# Patient Record
Sex: FEMALE | Race: WHITE | Employment: OTHER | URBAN - METROPOLITAN AREA
[De-identification: names, ages, dates, MRNs, and addresses within clinical notes are randomized per-mention and may not be internally consistent; named-entity substitution may affect disease eponyms.]

---

## 2023-11-17 ENCOUNTER — OFFICE VISIT (OUTPATIENT)
Dept: PRIMARY CARE | Facility: CLINIC | Age: 78
End: 2023-11-17
Payer: COMMERCIAL

## 2023-11-17 VITALS
HEART RATE: 69 BPM | DIASTOLIC BLOOD PRESSURE: 82 MMHG | TEMPERATURE: 97.1 F | WEIGHT: 154 LBS | BODY MASS INDEX: 30.23 KG/M2 | HEIGHT: 60 IN | SYSTOLIC BLOOD PRESSURE: 128 MMHG

## 2023-11-17 DIAGNOSIS — L97.121: Primary | ICD-10-CM

## 2023-11-17 DIAGNOSIS — I10 PRIMARY HYPERTENSION: ICD-10-CM

## 2023-11-17 DIAGNOSIS — L03.116 CELLULITIS OF LEFT THIGH: ICD-10-CM

## 2023-11-17 PROCEDURE — 3079F DIAST BP 80-89 MM HG: CPT | Performed by: INTERNAL MEDICINE

## 2023-11-17 PROCEDURE — 3074F SYST BP LT 130 MM HG: CPT | Performed by: INTERNAL MEDICINE

## 2023-11-17 PROCEDURE — 1159F MED LIST DOCD IN RCRD: CPT | Performed by: INTERNAL MEDICINE

## 2023-11-17 PROCEDURE — 1036F TOBACCO NON-USER: CPT | Performed by: INTERNAL MEDICINE

## 2023-11-17 PROCEDURE — 99213 OFFICE O/P EST LOW 20 MIN: CPT | Performed by: INTERNAL MEDICINE

## 2023-11-17 RX ORDER — FLUTICASONE FUROATE, UMECLIDINIUM BROMIDE AND VILANTEROL TRIFENATATE 100; 62.5; 25 UG/1; UG/1; UG/1
1 POWDER RESPIRATORY (INHALATION) DAILY
COMMUNITY
Start: 2023-07-17 | End: 2024-05-08 | Stop reason: SDUPTHER

## 2023-11-17 RX ORDER — LISINOPRIL 20 MG/1
20 TABLET ORAL DAILY
COMMUNITY
Start: 2023-10-24 | End: 2024-03-19 | Stop reason: SINTOL

## 2023-11-17 RX ORDER — ALBUTEROL SULFATE 90 UG/1
2 AEROSOL, METERED RESPIRATORY (INHALATION) EVERY 6 HOURS PRN
COMMUNITY
Start: 2023-01-09

## 2023-11-17 RX ORDER — ASPIRIN 325 MG
50000 TABLET, DELAYED RELEASE (ENTERIC COATED) ORAL
COMMUNITY
Start: 2023-04-25 | End: 2024-04-03 | Stop reason: SDUPTHER

## 2023-11-17 RX ORDER — ROSUVASTATIN CALCIUM 10 MG/1
10 TABLET, COATED ORAL NIGHTLY
COMMUNITY
Start: 2023-07-17 | End: 2024-03-19 | Stop reason: SDUPTHER

## 2023-11-17 RX ORDER — DOXYCYCLINE 100 MG/1
100 CAPSULE ORAL 2 TIMES DAILY
Qty: 20 CAPSULE | Refills: 0 | Status: SHIPPED | OUTPATIENT
Start: 2023-11-17 | End: 2023-11-27

## 2023-11-17 ASSESSMENT — PATIENT HEALTH QUESTIONNAIRE - PHQ9
2. FEELING DOWN, DEPRESSED OR HOPELESS: NOT AT ALL
1. LITTLE INTEREST OR PLEASURE IN DOING THINGS: NOT AT ALL
SUM OF ALL RESPONSES TO PHQ9 QUESTIONS 1 & 2: 0

## 2023-11-17 ASSESSMENT — ENCOUNTER SYMPTOMS
DEPRESSION: 0
LOSS OF SENSATION IN FEET: 0
OCCASIONAL FEELINGS OF UNSTEADINESS: 0

## 2023-11-17 NOTE — PROGRESS NOTES
Patient is seen in the office today accompanied by her daughter for left thigh ulcer for almost a month.  Patient denies any trauma or injury to that area.  The surrounding skin is slightly red and tender.  She denies any fever or chills.  Has no shortness of breath or wheezing.  Denies any other rash.  Review of other systems are negative.    On examination:  General examination: There is no acute discomfort  Eyes: There is no pallor or jaundice  Lungs: Clear to auscultation  CVS: Heart sounds are regular there is no gallop murmur  Skin: On the lateral aspect of left thigh there is 1/2 cm diameter ulcer with serosanguineous discharge.  Surrounding skin is erythematous.    Assessment and plan:  1.  Left thigh ulcer: Etiology is not clear.  Local dressing is applied and antibiotic is being prescribed  2.  Cellulitis of the left thigh: We will prescribe doxycycline.  Patient is advised to call my office if there is no improvement in her symptoms.  If the ulcer is nonhealing she may need biopsy and further investigation which is explained to the patient  3.  Hypertension: Controlled on the current medications

## 2024-03-18 PROBLEM — I73.89 OTHER SPECIFIED PERIPHERAL VASCULAR DISEASES (CMS-HCC): Status: ACTIVE | Noted: 2024-03-18

## 2024-03-18 PROBLEM — B35.1 ONYCHOMYCOSIS: Status: ACTIVE | Noted: 2024-03-18

## 2024-03-19 ENCOUNTER — OFFICE VISIT (OUTPATIENT)
Dept: PRIMARY CARE | Facility: CLINIC | Age: 79
End: 2024-03-19
Payer: MEDICARE

## 2024-03-19 VITALS
DIASTOLIC BLOOD PRESSURE: 67 MMHG | HEART RATE: 70 BPM | SYSTOLIC BLOOD PRESSURE: 145 MMHG | WEIGHT: 157 LBS | HEIGHT: 60 IN | TEMPERATURE: 98 F | BODY MASS INDEX: 30.82 KG/M2

## 2024-03-19 DIAGNOSIS — J30.9 ALLERGIC RHINITIS, UNSPECIFIED SEASONALITY, UNSPECIFIED TRIGGER: ICD-10-CM

## 2024-03-19 DIAGNOSIS — L98.491 SKIN ULCER, LIMITED TO BREAKDOWN OF SKIN (MULTI): ICD-10-CM

## 2024-03-19 DIAGNOSIS — Z78.0 MENOPAUSE PRESENT: ICD-10-CM

## 2024-03-19 DIAGNOSIS — Z00.00 HEALTH MAINTENANCE EXAMINATION: Primary | ICD-10-CM

## 2024-03-19 DIAGNOSIS — L98.9 SKIN LESION: ICD-10-CM

## 2024-03-19 DIAGNOSIS — E78.2 MIXED HYPERLIPIDEMIA: ICD-10-CM

## 2024-03-19 DIAGNOSIS — R06.09 DYSPNEA ON EXERTION: ICD-10-CM

## 2024-03-19 DIAGNOSIS — I10 PRIMARY HYPERTENSION: ICD-10-CM

## 2024-03-19 DIAGNOSIS — I48.91 NEW ONSET A-FIB (MULTI): ICD-10-CM

## 2024-03-19 DIAGNOSIS — Z13.29 THYROID DISORDER SCREENING: ICD-10-CM

## 2024-03-19 DIAGNOSIS — R94.31 ABNORMAL ECG: ICD-10-CM

## 2024-03-19 DIAGNOSIS — E55.9 VITAMIN D DEFICIENCY: ICD-10-CM

## 2024-03-19 PROCEDURE — G0444 DEPRESSION SCREEN ANNUAL: HCPCS | Performed by: INTERNAL MEDICINE

## 2024-03-19 PROCEDURE — 1160F RVW MEDS BY RX/DR IN RCRD: CPT | Performed by: INTERNAL MEDICINE

## 2024-03-19 PROCEDURE — 99397 PER PM REEVAL EST PAT 65+ YR: CPT | Performed by: INTERNAL MEDICINE

## 2024-03-19 PROCEDURE — G0439 PPPS, SUBSEQ VISIT: HCPCS | Performed by: INTERNAL MEDICINE

## 2024-03-19 PROCEDURE — 1159F MED LIST DOCD IN RCRD: CPT | Performed by: INTERNAL MEDICINE

## 2024-03-19 PROCEDURE — 93000 ELECTROCARDIOGRAM COMPLETE: CPT | Performed by: INTERNAL MEDICINE

## 2024-03-19 PROCEDURE — 3078F DIAST BP <80 MM HG: CPT | Performed by: INTERNAL MEDICINE

## 2024-03-19 PROCEDURE — 1036F TOBACCO NON-USER: CPT | Performed by: INTERNAL MEDICINE

## 2024-03-19 PROCEDURE — 1170F FXNL STATUS ASSESSED: CPT | Performed by: INTERNAL MEDICINE

## 2024-03-19 PROCEDURE — 99214 OFFICE O/P EST MOD 30 MIN: CPT | Performed by: INTERNAL MEDICINE

## 2024-03-19 PROCEDURE — 3077F SYST BP >= 140 MM HG: CPT | Performed by: INTERNAL MEDICINE

## 2024-03-19 RX ORDER — VALSARTAN 160 MG/1
160 TABLET ORAL DAILY
Qty: 100 TABLET | Refills: 3 | Status: SHIPPED | OUTPATIENT
Start: 2024-03-19 | End: 2025-04-23

## 2024-03-19 RX ORDER — ROSUVASTATIN CALCIUM 10 MG/1
10 TABLET, COATED ORAL NIGHTLY
Qty: 90 TABLET | Refills: 1 | Status: SHIPPED | OUTPATIENT
Start: 2024-03-19 | End: 2024-09-15

## 2024-03-19 RX ORDER — CETIRIZINE HYDROCHLORIDE 10 MG/1
10 TABLET ORAL AS NEEDED
COMMUNITY

## 2024-03-19 RX ORDER — ALBUTEROL SULFATE 0.83 MG/ML
2.5 SOLUTION RESPIRATORY (INHALATION) EVERY 4 HOURS PRN
COMMUNITY
Start: 2023-11-16

## 2024-03-19 RX ORDER — DOXYCYCLINE 100 MG/1
100 CAPSULE ORAL 2 TIMES DAILY
Qty: 14 CAPSULE | Refills: 0 | Status: SHIPPED | OUTPATIENT
Start: 2024-03-19 | End: 2024-03-26

## 2024-03-19 RX ORDER — OLOPATADINE HYDROCHLORIDE AND MOMETASONE FUROATE 25; 665 UG/1; UG/1
1 SPRAY, METERED NASAL 2 TIMES DAILY
Qty: 29 G | Refills: 0 | COMMUNITY
Start: 2024-03-19

## 2024-03-19 ASSESSMENT — ENCOUNTER SYMPTOMS
LOSS OF SENSATION IN FEET: 0
OCCASIONAL FEELINGS OF UNSTEADINESS: 0
PALPITATIONS: 0
CHILLS: 0
VOMITING: 0
ABDOMINAL PAIN: 0
DEPRESSION: 0
AGITATION: 0
SHORTNESS OF BREATH: 1
FEVER: 0
COUGH: 1
BLOOD IN STOOL: 0
NERVOUS/ANXIOUS: 1
SORE THROAT: 0
DIZZINESS: 1
DYSURIA: 0
DIARRHEA: 0
NAUSEA: 0

## 2024-03-19 ASSESSMENT — ACTIVITIES OF DAILY LIVING (ADL)
DRESSING YOURSELF: INDEPENDENT
WALKS IN HOME: INDEPENDENT
TOILETING: INDEPENDENT
FEEDING YOURSELF: INDEPENDENT
TAKING_MEDICATION: NEEDS ASSISTANCE
DOING_HOUSEWORK: INDEPENDENT
PATIENT'S MEMORY ADEQUATE TO SAFELY COMPLETE DAILY ACTIVITIES?: YES
BATHING: INDEPENDENT
ADEQUATE_TO_COMPLETE_ADL: YES
GROCERY_SHOPPING: NEEDS ASSISTANCE
GROOMING: INDEPENDENT
JUDGMENT_ADEQUATE_SAFELY_COMPLETE_DAILY_ACTIVITIES: YES
MANAGING_FINANCES: TOTAL CARE
HEARING - RIGHT EAR: DIFFICULTY WITH NOISE
HEARING - LEFT EAR: DIFFICULTY WITH NOISE

## 2024-03-19 ASSESSMENT — PATIENT HEALTH QUESTIONNAIRE - PHQ9
1. LITTLE INTEREST OR PLEASURE IN DOING THINGS: NOT AT ALL
SUM OF ALL RESPONSES TO PHQ9 QUESTIONS 1 AND 2: 0
2. FEELING DOWN, DEPRESSED OR HOPELESS: NOT AT ALL

## 2024-03-19 ASSESSMENT — COLUMBIA-SUICIDE SEVERITY RATING SCALE - C-SSRS
1. IN THE PAST MONTH, HAVE YOU WISHED YOU WERE DEAD OR WISHED YOU COULD GO TO SLEEP AND NOT WAKE UP?: NO
2. HAVE YOU ACTUALLY HAD ANY THOUGHTS OF KILLING YOURSELF?: NO
6. HAVE YOU EVER DONE ANYTHING, STARTED TO DO ANYTHING, OR PREPARED TO DO ANYTHING TO END YOUR LIFE?: NO

## 2024-03-19 NOTE — PROGRESS NOTES
Subjective   Reason for Visit: Marion Robles is an 78 y.o. female here for a Medicare Wellness visit.     HPI The patient presents to the office for annual wellness visit.  She is up-to-date on age and gender recommended screening with the exception of mammogram and colonoscopy which she defers.  She would like to do bone density study.  Patient has multiple complaints today chronic cough, postnasal drip, multiple skin ulcerations that are recurrent.  She saw my partner in November for this and at that time was treated for a left thigh ulcer that resolved now has multiple ulcers on the arm and left posterior back with some erythema surrounding the ulcer on the posterior shoulder. These are superficial.  She denies any fever, chills, chest pain.  Does have shortness of breath with exertion.  No palpitations.  She did see pulmonology for evaluation of COPD and possible asthma but was unable to tolerate the pulmonary function test and so it was nondiagnostic.  She was supposed to follow-up for a CT scan of the chest that was abnormal in August but has not completed this yet.  Patient also due for blood work and concerned about allergies to animals we discussed doing a blood test to look for possible allergies.  She does not take her medications consistently is noncompliant with medications.  Sometimes only take the medicine 2-3 times a week.  She is not taking Trelegy consistently either.  She is not up-to-date on age and gender recommended Immunizations due for tetanus vaccine and shingles vaccine which she is recommend to get at her pharmacy.      Patient Care Team:  Anderson Gutierrez DO as PCP - General  Anderson Gutierrez DO as PCP - [employer] PCP     Review of Systems   Constitutional:  Negative for chills and fever.   HENT:  Negative for sore throat.    Eyes:  Negative for visual disturbance.   Respiratory:  Positive for cough and shortness of breath.    Cardiovascular:  Negative for chest pain,  palpitations and leg swelling.   Gastrointestinal:  Negative for abdominal pain, blood in stool, diarrhea, nausea and vomiting.   Genitourinary:  Negative for dysuria.   Skin:  Negative for rash.   Neurological:  Positive for dizziness. Negative for syncope.   Psychiatric/Behavioral:  Negative for agitation. The patient is nervous/anxious.        Objective   Vitals:  /67   Pulse 70   Temp 36.7 °C (98 °F) (Temporal)   Ht 1.524 m (5')   Wt 71.2 kg (157 lb)   BMI 30.66 kg/m²       Physical Exam  Vitals and nursing note reviewed.   Constitutional:       General: She is not in acute distress.     Appearance: Normal appearance. She is obese. She is not ill-appearing, toxic-appearing or diaphoretic.   HENT:      Head: Normocephalic and atraumatic.      Nose: Congestion present.      Mouth/Throat:      Mouth: Mucous membranes are moist.      Pharynx: Oropharynx is clear. No oropharyngeal exudate.   Eyes:      Extraocular Movements: Extraocular movements intact.      Pupils: Pupils are equal, round, and reactive to light.   Cardiovascular:      Rate and Rhythm: Normal rate. Rhythm irregular.      Heart sounds: Normal heart sounds.   Pulmonary:      Effort: Pulmonary effort is normal. No respiratory distress.      Breath sounds: Normal breath sounds. No wheezing, rhonchi or rales.   Abdominal:      General: Bowel sounds are normal. There is no distension.      Palpations: Abdomen is soft. There is no mass.      Tenderness: There is no abdominal tenderness.   Musculoskeletal:      Cervical back: Neck supple. No tenderness.      Right lower leg: No edema.      Left lower leg: No edema.   Skin:     General: Skin is warm and dry.      Findings: Rash (Multiple excoriations shallow ulcerations of the right arm left posterior shoulder consistent with neurotic excoriation.) present.   Neurological:      General: No focal deficit present.      Mental Status: She is alert. Mental status is at baseline.      Cranial Nerves:  No cranial nerve deficit.   Psychiatric:         Mood and Affect: Mood normal.         Behavior: Behavior normal.         Thought Content: Thought content normal.         Judgment: Judgment normal.         Assessment/Plan   Problem List Items Addressed This Visit       Dyspnea on exertion    Relevant Orders    ECG 12 lead (Clinic Performed)    Vitamin D deficiency    Relevant Orders    Vitamin D 25-Hydroxy,Total (for eval of Vitamin D levels)    Allergic rhinitis    Relevant Medications    olopatadine-mometasone (Ryaltris) 665-25 mcg/spray spray,non-aerosol    Other Relevant Orders    Respiratory Allergy Profile IgE    Thyroid disorder screening    Relevant Orders    TSH with reflex to Free T4 if abnormal    Health maintenance examination - Primary    Skin ulcer, limited to breakdown of skin (CMS/HCC)    Relevant Medications    doxycycline (Vibramycin) 100 mg capsule    Other Relevant Orders    Referral to Dermatology    Skin lesion    Relevant Medications    doxycycline (Vibramycin) 100 mg capsule    Other Relevant Orders    Referral to Dermatology    Mixed hyperlipidemia    Relevant Medications    rosuvastatin (Crestor) 10 mg tablet    Other Relevant Orders    Lipid panel    Primary hypertension    Relevant Medications    valsartan (Diovan) 160 mg tablet    Other Relevant Orders    CBC and Auto Differential    Comprehensive metabolic panel    ECG 12 lead (Clinic Performed)     Health maintenance examination: Patient is not up-to-date on age and gender recommended screening she is due for mammogram, colonoscopy which she defers. DEXA scan was ordered at the patient's request. She is not up-to-date on age and recommended vaccinations due for tetanus vaccine and shingles vaccine which is recommended get her pharmacy    Hypertension: Chronic, uncontrolled.  We are going to change her prescription from lisinopril to valsartan due to chronic cough    Vitamin D deficiency: We will check a vitamin D level    Dyspnea on  exertion: We have ordered an EKG for further evaluation.  She has suspected asthma versus COPD has seen pulmonology but was unable to tolerate pulmonary function test patient supposed to have repeat CT scan as well for abnormal CT scan she had in August but has not completed this yet.  She will continue Trelegy at this time we discussed the importance of taking this daily.    Allergic rhinitis: I gave her samples of Ryaltris and check a respiratory allergy profile    Thyroid disorder screening: Check a TSH level    Skin ulcer limited breakdown of skin/skin lesion: I will prescribe doxycycline for possible infected skin lesion and refer patient to dermatology due to this being recurrent issues.  Suspect there is some neurotic excoriation she does admit to some anxiety I recommend trying a prescribed a trial of Prozac or Zoloft but she defers at this time.    Hyperlipidemia: We will check a lipid panel continue rosuvastatin we discussed the importance of compliance with medications    New onset A-fib/abnormal EKG: Patient has abnormal EKG with atrial fibrillation.  We are going to obtain an echocardiogram and Holter monitor.  She will have a Holter monitor placed when she comes in for echo.  Will refer the patient to cardiology as well for further evaluation.  Patient has a CHADS2 Vascor of 4 points for hypertension, female gender, age.  She will be started on Eliquis for stroke prophylaxis.  She has a has bled score of 1+ for age.    Depression screening was completed today with PHQ 2 9 score

## 2024-04-02 ENCOUNTER — LAB (OUTPATIENT)
Dept: LAB | Facility: LAB | Age: 79
End: 2024-04-02
Payer: MEDICARE

## 2024-04-02 ENCOUNTER — HOSPITAL ENCOUNTER (OUTPATIENT)
Dept: CARDIOLOGY | Facility: CLINIC | Age: 79
Discharge: HOME | End: 2024-04-02
Payer: MEDICARE

## 2024-04-02 VITALS
SYSTOLIC BLOOD PRESSURE: 142 MMHG | DIASTOLIC BLOOD PRESSURE: 76 MMHG | BODY MASS INDEX: 30.82 KG/M2 | WEIGHT: 157 LBS | HEIGHT: 60 IN

## 2024-04-02 DIAGNOSIS — R94.31 ABNORMAL ECG: ICD-10-CM

## 2024-04-02 DIAGNOSIS — E78.2 MIXED HYPERLIPIDEMIA: ICD-10-CM

## 2024-04-02 DIAGNOSIS — E55.9 VITAMIN D DEFICIENCY: ICD-10-CM

## 2024-04-02 DIAGNOSIS — I10 PRIMARY HYPERTENSION: ICD-10-CM

## 2024-04-02 DIAGNOSIS — Z13.29 THYROID DISORDER SCREENING: ICD-10-CM

## 2024-04-02 DIAGNOSIS — I48.91 NEW ONSET A-FIB (MULTI): ICD-10-CM

## 2024-04-02 DIAGNOSIS — J30.9 ALLERGIC RHINITIS, UNSPECIFIED SEASONALITY, UNSPECIFIED TRIGGER: ICD-10-CM

## 2024-04-02 LAB
25(OH)D3 SERPL-MCNC: 25 NG/ML (ref 30–100)
ALBUMIN SERPL BCP-MCNC: 4.3 G/DL (ref 3.4–5)
ALP SERPL-CCNC: 54 U/L (ref 33–136)
ALT SERPL W P-5'-P-CCNC: 16 U/L (ref 7–45)
ANION GAP SERPL CALC-SCNC: 11 MMOL/L (ref 10–20)
AORTIC VALVE MEAN GRADIENT: 3 MMHG
AORTIC VALVE PEAK VELOCITY: 1.18 M/S
AST SERPL W P-5'-P-CCNC: 16 U/L (ref 9–39)
AV PEAK GRADIENT: 5.6 MMHG
AVA (PEAK VEL): 1.65 CM2
AVA (VTI): 1.86 CM2
BASOPHILS # BLD AUTO: 0.03 X10*3/UL (ref 0–0.1)
BASOPHILS NFR BLD AUTO: 0.8 %
BILIRUB SERPL-MCNC: 0.7 MG/DL (ref 0–1.2)
BUN SERPL-MCNC: 20 MG/DL (ref 6–23)
CALCIUM SERPL-MCNC: 9.1 MG/DL (ref 8.6–10.3)
CHLORIDE SERPL-SCNC: 106 MMOL/L (ref 98–107)
CHOLEST SERPL-MCNC: 140 MG/DL (ref 0–199)
CHOLESTEROL/HDL RATIO: 3.1
CO2 SERPL-SCNC: 30 MMOL/L (ref 21–32)
CREAT SERPL-MCNC: 0.97 MG/DL (ref 0.5–1.05)
EGFRCR SERPLBLD CKD-EPI 2021: 61 ML/MIN/1.73M*2
EJECTION FRACTION APICAL 4 CHAMBER: 64.9
EOSINOPHIL # BLD AUTO: 0.06 X10*3/UL (ref 0–0.4)
EOSINOPHIL NFR BLD AUTO: 1.6 %
ERYTHROCYTE [DISTWIDTH] IN BLOOD BY AUTOMATED COUNT: 14.5 % (ref 11.5–14.5)
GLUCOSE SERPL-MCNC: 119 MG/DL (ref 74–99)
HCT VFR BLD AUTO: 40.8 % (ref 36–46)
HDLC SERPL-MCNC: 44.7 MG/DL
HGB BLD-MCNC: 12.5 G/DL (ref 12–16)
IMM GRANULOCYTES # BLD AUTO: 0.01 X10*3/UL (ref 0–0.5)
IMM GRANULOCYTES NFR BLD AUTO: 0.3 % (ref 0–0.9)
LDLC SERPL CALC-MCNC: 75 MG/DL
LEFT VENTRICLE INTERNAL DIMENSION DIASTOLE: 4.34 CM (ref 3.5–6)
LEFT VENTRICULAR OUTFLOW TRACT DIAMETER: 1.8 CM
LYMPHOCYTES # BLD AUTO: 0.8 X10*3/UL (ref 0.8–3)
LYMPHOCYTES NFR BLD AUTO: 21.6 %
MCH RBC QN AUTO: 28.2 PG (ref 26–34)
MCHC RBC AUTO-ENTMCNC: 30.6 G/DL (ref 32–36)
MCV RBC AUTO: 92 FL (ref 80–100)
MITRAL VALVE E/E' RATIO: 13.6
MONOCYTES # BLD AUTO: 0.2 X10*3/UL (ref 0.05–0.8)
MONOCYTES NFR BLD AUTO: 5.4 %
NEUTROPHILS # BLD AUTO: 2.6 X10*3/UL (ref 1.6–5.5)
NEUTROPHILS NFR BLD AUTO: 70.3 %
NON HDL CHOLESTEROL: 95 MG/DL (ref 0–149)
NRBC BLD-RTO: 0 /100 WBCS (ref 0–0)
PLATELET # BLD AUTO: 150 X10*3/UL (ref 150–450)
POTASSIUM SERPL-SCNC: 4.4 MMOL/L (ref 3.5–5.3)
PROT SERPL-MCNC: 6.4 G/DL (ref 6.4–8.2)
RBC # BLD AUTO: 4.44 X10*6/UL (ref 4–5.2)
RIGHT VENTRICLE PEAK SYSTOLIC PRESSURE: 36.2 MMHG
SODIUM SERPL-SCNC: 143 MMOL/L (ref 136–145)
TRIGL SERPL-MCNC: 100 MG/DL (ref 0–149)
TSH SERPL-ACNC: 2.69 MIU/L (ref 0.44–3.98)
VLDL: 20 MG/DL (ref 0–40)
WBC # BLD AUTO: 3.7 X10*3/UL (ref 4.4–11.3)

## 2024-04-02 PROCEDURE — 93306 TTE W/DOPPLER COMPLETE: CPT | Performed by: INTERNAL MEDICINE

## 2024-04-02 PROCEDURE — 86003 ALLG SPEC IGE CRUDE XTRC EA: CPT

## 2024-04-02 PROCEDURE — 84443 ASSAY THYROID STIM HORMONE: CPT

## 2024-04-02 PROCEDURE — 80053 COMPREHEN METABOLIC PANEL: CPT

## 2024-04-02 PROCEDURE — 80061 LIPID PANEL: CPT

## 2024-04-02 PROCEDURE — 36415 COLL VENOUS BLD VENIPUNCTURE: CPT

## 2024-04-02 PROCEDURE — 82306 VITAMIN D 25 HYDROXY: CPT

## 2024-04-02 PROCEDURE — 93306 TTE W/DOPPLER COMPLETE: CPT

## 2024-04-02 PROCEDURE — 85025 COMPLETE CBC W/AUTO DIFF WBC: CPT

## 2024-04-02 PROCEDURE — 82785 ASSAY OF IGE: CPT

## 2024-04-02 NOTE — RESULT ENCOUNTER NOTE
Patient's labs showed mild elevation of sugar level, low vitamin D, blood counts are stable. Normal cholesterol and thyroid levels. Recommend she be taking the 50,000 international units  of vitamin D3 weekly that is listed on her med list.

## 2024-04-03 DIAGNOSIS — E55.9 VITAMIN D DEFICIENCY: ICD-10-CM

## 2024-04-03 LAB

## 2024-04-03 NOTE — TELEPHONE ENCOUNTER
----- Message from Anderson Gutierrez DO sent at 4/2/2024  5:39 PM EDT -----  Patient's labs showed mild elevation of sugar level, low vitamin D, blood counts are stable. Normal cholesterol and thyroid levels. Recommend she be taking the 50,000 international units  of vitamin D3 weekly that is listed on her med list.

## 2024-04-04 ENCOUNTER — TELEPHONE (OUTPATIENT)
Dept: PRIMARY CARE | Facility: CLINIC | Age: 79
End: 2024-04-04
Payer: MEDICARE

## 2024-04-04 RX ORDER — ASPIRIN 325 MG
50000 TABLET, DELAYED RELEASE (ENTERIC COATED) ORAL
Qty: 12 CAPSULE | Refills: 1 | Status: SHIPPED | OUTPATIENT
Start: 2024-04-04

## 2024-04-04 NOTE — TELEPHONE ENCOUNTER
----- Message from Anderson Gutierrez DO sent at 4/4/2024 12:23 AM EDT -----  Patient's echocardiogram showed normal Pumping function of the heart.  There was a small amount of fluid in the sac around the heart but it has not caused any issues with the heart's function.  Patient does have a mild enlargement of the left atrium   which is one of the upper chambers of the heart and this can contribute to atrial fibrillation.  The rest of her echocardiogram was unremarkable.  Cardiology will discuss the results with her in further detail.

## 2024-04-04 NOTE — TELEPHONE ENCOUNTER
----- Message from Anderson Gutierrez DO sent at 4/4/2024 12:13 AM EDT -----  Respiratory panel did not show any significant allergens

## 2024-04-04 NOTE — RESULT ENCOUNTER NOTE
Patient's echocardiogram showed normal Pumping function of the heart.  There was a small amount of fluid in the sac around the heart but it has not caused any issues with the heart's function.  Patient does have a mild enlargement of the left atrium which is one of the upper chambers of the heart and this can contribute to atrial fibrillation.  The rest of her echocardiogram was unremarkable.  Cardiology will discuss the results with her in further detail.

## 2024-04-09 ENCOUNTER — OFFICE VISIT (OUTPATIENT)
Dept: CARDIOLOGY | Facility: CLINIC | Age: 79
End: 2024-04-09
Payer: MEDICARE

## 2024-04-09 ENCOUNTER — TELEPHONE (OUTPATIENT)
Dept: CARDIOLOGY | Facility: CLINIC | Age: 79
End: 2024-04-09

## 2024-04-09 VITALS
HEART RATE: 84 BPM | HEIGHT: 62 IN | DIASTOLIC BLOOD PRESSURE: 82 MMHG | BODY MASS INDEX: 29.63 KG/M2 | WEIGHT: 161 LBS | TEMPERATURE: 97.6 F | SYSTOLIC BLOOD PRESSURE: 124 MMHG

## 2024-04-09 DIAGNOSIS — E78.2 MIXED HYPERLIPIDEMIA: ICD-10-CM

## 2024-04-09 DIAGNOSIS — R07.9 CHEST PAIN, UNSPECIFIED TYPE: ICD-10-CM

## 2024-04-09 DIAGNOSIS — I48.91 NEW ONSET A-FIB (MULTI): ICD-10-CM

## 2024-04-09 DIAGNOSIS — I31.39 PERICARDIAL EFFUSION (HHS-HCC): ICD-10-CM

## 2024-04-09 DIAGNOSIS — R06.09 DYSPNEA ON EXERTION: Primary | ICD-10-CM

## 2024-04-09 DIAGNOSIS — I10 PRIMARY HYPERTENSION: ICD-10-CM

## 2024-04-09 PROCEDURE — 3074F SYST BP LT 130 MM HG: CPT | Performed by: INTERNAL MEDICINE

## 2024-04-09 PROCEDURE — 99205 OFFICE O/P NEW HI 60 MIN: CPT | Performed by: INTERNAL MEDICINE

## 2024-04-09 PROCEDURE — 1157F ADVNC CARE PLAN IN RCRD: CPT | Performed by: INTERNAL MEDICINE

## 2024-04-09 PROCEDURE — 1159F MED LIST DOCD IN RCRD: CPT | Performed by: INTERNAL MEDICINE

## 2024-04-09 PROCEDURE — 3079F DIAST BP 80-89 MM HG: CPT | Performed by: INTERNAL MEDICINE

## 2024-04-09 PROCEDURE — 1126F AMNT PAIN NOTED NONE PRSNT: CPT | Performed by: INTERNAL MEDICINE

## 2024-04-09 RX ORDER — REGADENOSON 0.08 MG/ML
0.4 INJECTION, SOLUTION INTRAVENOUS
OUTPATIENT
Start: 2024-04-09

## 2024-04-09 ASSESSMENT — PAIN SCALES - GENERAL: PAINLEVEL: 0-NO PAIN

## 2024-04-09 NOTE — PROGRESS NOTES
1.  New onset atrial fibrillation  2.  Small pericardial effusion, unclear etiology  3.  Hypertension  4.  Hyperlipidemia  5.  Noncompliant with medical advice  6.  Memory deficits  7.  Overweight    Patient is a pleasant 78-year-old female with the above-noted pertinent past medical history who presents today for follow-up she is accompanied by her daughter who provides most of the history, she states that she has been experiencing chest tightness of unclear duration per daughter at times lasting a day, these events are unrelated to exertion, she also has been recently diagnosed with atrial fibrillation for which she has complaints occasional palpitation which the durations are unclear per daughter patient is also noncompliant with taking her medication that she takes her medication at most 3-4 days of the week, she does not recall any recent viral illness, she has no complaints of orthopnea PND or syncopal episode she has had some gradual weight gain and denies lower extremity edema.    Past surgical history  Spinal ectomy trauma     Family medical history  Father is unknown to her  Mother  54 years of age metastatic colon cancer    Review of system  Single, mother of 2 adult children she has no history of smoking and consumes very rare alcoholic beverages    Allergies oxycodone, reaction unclear    Medication list is reviewed as noted above    Review of system  Last eye examination in , she consumes 1 cup of coffee per day, per daughter progressive memory loss, hard of hearing, all other systems reviewed and negative for complaints    BMI 32, blood pressure of 120/82 mmHg and heart rate of 84 bpm elderly female in no acute distress at times appears to be confused difficult to assess as she is hard of hearing, no carotid bruits upstroke and volumes are normal heart irregularly irregular S1 variable S2 is normal no gallop or murmurs, lungs decreased breath sound, abdomen is moderately distended  positive bowel sounds soft and nontender    3/19/2024  Echocardiography showed normal LV systolic function LVEF of 55+/- 5% small pericardial effusion and mildly enlarged left atrium    EKG shows atrial fibrillation controlled ventricular rate of 78 bpm nonspecific ST and T wave abnormalities possible IMR and low voltage to maybe due to her pericardial effusion    TSH 2.69    Total cholesterol 140, triglyceride 100, HDL 45, and LDL of 75    Sodium 143, potassium 4.4, BUN 20, creatinine 0.97 blood glucose level of 119  Hemoglobin 12.5 hematocrit of 40.8    78-year-old female with presentation of new onset atrial fibrillation who was prescribed to take Eliquis however the compliance with the medication is unclear at best, case was discussed with the patient daughter offers to manage her anticoagulant patient is to continue with the current regimen of Eliquis 1 tablet twice daily and to return to my clinic in 1 month for reevaluation and possible cardioversion, she also has small pericardial effusion for which the TSH was performed and proven to be normal will obtain CRP LUDA and tuberculin skin test, she also will benefit from a Lexiscan nuclear test for evaluation of her chest discomfort, patient is to return to my clinic in 1 month for reevaluation.

## 2024-04-12 ENCOUNTER — HOSPITAL ENCOUNTER (EMERGENCY)
Facility: HOSPITAL | Age: 79
Discharge: HOME | End: 2024-04-12
Attending: EMERGENCY MEDICINE
Payer: MEDICARE

## 2024-04-12 ENCOUNTER — APPOINTMENT (OUTPATIENT)
Dept: RADIOLOGY | Facility: HOSPITAL | Age: 79
End: 2024-04-12
Payer: MEDICARE

## 2024-04-12 ENCOUNTER — APPOINTMENT (OUTPATIENT)
Dept: CARDIOLOGY | Facility: HOSPITAL | Age: 79
End: 2024-04-12
Payer: MEDICARE

## 2024-04-12 VITALS
OXYGEN SATURATION: 94 % | WEIGHT: 160 LBS | SYSTOLIC BLOOD PRESSURE: 173 MMHG | RESPIRATION RATE: 20 BRPM | HEIGHT: 62 IN | DIASTOLIC BLOOD PRESSURE: 86 MMHG | TEMPERATURE: 97.9 F | BODY MASS INDEX: 29.44 KG/M2 | HEART RATE: 83 BPM

## 2024-04-12 DIAGNOSIS — J18.9 PNEUMONIA OF LEFT LOWER LOBE DUE TO INFECTIOUS ORGANISM: ICD-10-CM

## 2024-04-12 DIAGNOSIS — J44.1 COPD EXACERBATION (MULTI): Primary | ICD-10-CM

## 2024-04-12 LAB
ALBUMIN SERPL BCP-MCNC: 4.1 G/DL (ref 3.4–5)
ALP SERPL-CCNC: 47 U/L (ref 33–136)
ALT SERPL W P-5'-P-CCNC: 17 U/L (ref 7–45)
ANION GAP SERPL CALC-SCNC: 11 MMOL/L (ref 10–20)
AST SERPL W P-5'-P-CCNC: 18 U/L (ref 9–39)
BASOPHILS # BLD AUTO: 0.01 X10*3/UL (ref 0–0.1)
BASOPHILS NFR BLD AUTO: 0.3 %
BILIRUB SERPL-MCNC: 0.6 MG/DL (ref 0–1.2)
BNP SERPL-MCNC: 377 PG/ML (ref 0–99)
BUN SERPL-MCNC: 24 MG/DL (ref 6–23)
CALCIUM SERPL-MCNC: 9.1 MG/DL (ref 8.6–10.3)
CARDIAC TROPONIN I PNL SERPL HS: 27 NG/L (ref 0–13)
CARDIAC TROPONIN I PNL SERPL HS: 30 NG/L (ref 0–13)
CHLORIDE SERPL-SCNC: 107 MMOL/L (ref 98–107)
CO2 SERPL-SCNC: 28 MMOL/L (ref 21–32)
CREAT SERPL-MCNC: 0.91 MG/DL (ref 0.5–1.05)
D DIMER PPP FEU-MCNC: 584 NG/ML FEU
EGFRCR SERPLBLD CKD-EPI 2021: 66 ML/MIN/1.73M*2
EOSINOPHIL # BLD AUTO: 0.05 X10*3/UL (ref 0–0.4)
EOSINOPHIL NFR BLD AUTO: 1.5 %
ERYTHROCYTE [DISTWIDTH] IN BLOOD BY AUTOMATED COUNT: 14.6 % (ref 11.5–14.5)
FLUAV RNA RESP QL NAA+PROBE: NOT DETECTED
FLUBV RNA RESP QL NAA+PROBE: NOT DETECTED
GLUCOSE SERPL-MCNC: 112 MG/DL (ref 74–99)
HCT VFR BLD AUTO: 40.7 % (ref 36–46)
HGB BLD-MCNC: 12 G/DL (ref 12–16)
IMM GRANULOCYTES # BLD AUTO: 0 X10*3/UL (ref 0–0.5)
IMM GRANULOCYTES NFR BLD AUTO: 0 % (ref 0–0.9)
INR PPP: 1.8 (ref 0.9–1.1)
LYMPHOCYTES # BLD AUTO: 0.84 X10*3/UL (ref 0.8–3)
LYMPHOCYTES NFR BLD AUTO: 25 %
MCH RBC QN AUTO: 27.3 PG (ref 26–34)
MCHC RBC AUTO-ENTMCNC: 29.5 G/DL (ref 32–36)
MCV RBC AUTO: 93 FL (ref 80–100)
MONOCYTES # BLD AUTO: 0.2 X10*3/UL (ref 0.05–0.8)
MONOCYTES NFR BLD AUTO: 6 %
NEUTROPHILS # BLD AUTO: 2.26 X10*3/UL (ref 1.6–5.5)
NEUTROPHILS NFR BLD AUTO: 67.2 %
NRBC BLD-RTO: 0 /100 WBCS (ref 0–0)
PLATELET # BLD AUTO: 161 X10*3/UL (ref 150–450)
POTASSIUM SERPL-SCNC: 3.9 MMOL/L (ref 3.5–5.3)
PROCALCITONIN SERPL-MCNC: 0.02 NG/ML
PROT SERPL-MCNC: 6.3 G/DL (ref 6.4–8.2)
PROTHROMBIN TIME: 19.9 SECONDS (ref 9.8–12.8)
RBC # BLD AUTO: 4.39 X10*6/UL (ref 4–5.2)
RSV RNA RESP QL NAA+PROBE: NOT DETECTED
SARS-COV-2 RNA RESP QL NAA+PROBE: NOT DETECTED
SODIUM SERPL-SCNC: 142 MMOL/L (ref 136–145)
WBC # BLD AUTO: 3.4 X10*3/UL (ref 4.4–11.3)

## 2024-04-12 PROCEDURE — 71045 X-RAY EXAM CHEST 1 VIEW: CPT

## 2024-04-12 PROCEDURE — 71045 X-RAY EXAM CHEST 1 VIEW: CPT | Performed by: RADIOLOGY

## 2024-04-12 PROCEDURE — 2500000001 HC RX 250 WO HCPCS SELF ADMINISTERED DRUGS (ALT 637 FOR MEDICARE OP): Performed by: EMERGENCY MEDICINE

## 2024-04-12 PROCEDURE — 96374 THER/PROPH/DIAG INJ IV PUSH: CPT | Performed by: EMERGENCY MEDICINE

## 2024-04-12 PROCEDURE — 87637 SARSCOV2&INF A&B&RSV AMP PRB: CPT

## 2024-04-12 PROCEDURE — 2500000004 HC RX 250 GENERAL PHARMACY W/ HCPCS (ALT 636 FOR OP/ED)

## 2024-04-12 PROCEDURE — 2500000002 HC RX 250 W HCPCS SELF ADMINISTERED DRUGS (ALT 637 FOR MEDICARE OP, ALT 636 FOR OP/ED)

## 2024-04-12 PROCEDURE — 84145 PROCALCITONIN (PCT): CPT | Mod: STJLAB | Performed by: EMERGENCY MEDICINE

## 2024-04-12 PROCEDURE — 94640 AIRWAY INHALATION TREATMENT: CPT

## 2024-04-12 PROCEDURE — 84484 ASSAY OF TROPONIN QUANT: CPT | Performed by: EMERGENCY MEDICINE

## 2024-04-12 PROCEDURE — 99285 EMERGENCY DEPT VISIT HI MDM: CPT | Performed by: EMERGENCY MEDICINE

## 2024-04-12 PROCEDURE — 83880 ASSAY OF NATRIURETIC PEPTIDE: CPT | Performed by: EMERGENCY MEDICINE

## 2024-04-12 PROCEDURE — 99284 EMERGENCY DEPT VISIT MOD MDM: CPT | Mod: 25 | Performed by: EMERGENCY MEDICINE

## 2024-04-12 PROCEDURE — 36415 COLL VENOUS BLD VENIPUNCTURE: CPT | Performed by: EMERGENCY MEDICINE

## 2024-04-12 PROCEDURE — 85025 COMPLETE CBC W/AUTO DIFF WBC: CPT | Performed by: EMERGENCY MEDICINE

## 2024-04-12 PROCEDURE — 85379 FIBRIN DEGRADATION QUANT: CPT

## 2024-04-12 PROCEDURE — 36415 COLL VENOUS BLD VENIPUNCTURE: CPT

## 2024-04-12 PROCEDURE — 2500000001 HC RX 250 WO HCPCS SELF ADMINISTERED DRUGS (ALT 637 FOR MEDICARE OP)

## 2024-04-12 PROCEDURE — 93005 ELECTROCARDIOGRAM TRACING: CPT

## 2024-04-12 PROCEDURE — 84075 ASSAY ALKALINE PHOSPHATASE: CPT | Performed by: EMERGENCY MEDICINE

## 2024-04-12 PROCEDURE — 85610 PROTHROMBIN TIME: CPT | Performed by: EMERGENCY MEDICINE

## 2024-04-12 RX ORDER — AMOXICILLIN AND CLAVULANATE POTASSIUM 875; 125 MG/1; MG/1
1 TABLET, FILM COATED ORAL EVERY 12 HOURS
Qty: 10 TABLET | Refills: 0 | Status: SHIPPED | OUTPATIENT
Start: 2024-04-12 | End: 2024-04-17

## 2024-04-12 RX ORDER — IPRATROPIUM BROMIDE AND ALBUTEROL SULFATE 2.5; .5 MG/3ML; MG/3ML
9 SOLUTION RESPIRATORY (INHALATION) ONCE
Status: COMPLETED | OUTPATIENT
Start: 2024-04-12 | End: 2024-04-12

## 2024-04-12 RX ORDER — GUAIFENESIN 100 MG/5ML
200 SOLUTION ORAL ONCE
Status: COMPLETED | OUTPATIENT
Start: 2024-04-12 | End: 2024-04-12

## 2024-04-12 RX ORDER — AZITHROMYCIN 500 MG/1
500 TABLET, FILM COATED ORAL DAILY
Qty: 5 TABLET | Refills: 0 | Status: SHIPPED | OUTPATIENT
Start: 2024-04-12 | End: 2024-04-17

## 2024-04-12 RX ORDER — AZITHROMYCIN 500 MG/1
500 TABLET, FILM COATED ORAL ONCE
Status: COMPLETED | OUTPATIENT
Start: 2024-04-12 | End: 2024-04-12

## 2024-04-12 RX ORDER — PREDNISONE 50 MG/1
50 TABLET ORAL DAILY
Qty: 5 TABLET | Refills: 0 | Status: SHIPPED | OUTPATIENT
Start: 2024-04-12 | End: 2024-04-17

## 2024-04-12 RX ORDER — AMOXICILLIN AND CLAVULANATE POTASSIUM 875; 125 MG/1; MG/1
1 TABLET, FILM COATED ORAL ONCE
Status: COMPLETED | OUTPATIENT
Start: 2024-04-12 | End: 2024-04-12

## 2024-04-12 RX ADMIN — AZITHROMYCIN DIHYDRATE 500 MG: 500 TABLET ORAL at 14:06

## 2024-04-12 RX ADMIN — METHYLPREDNISOLONE SODIUM SUCCINATE 125 MG: 125 INJECTION, POWDER, FOR SOLUTION INTRAMUSCULAR; INTRAVENOUS at 11:37

## 2024-04-12 RX ADMIN — AMOXICILLIN AND CLAVULANATE POTASSIUM 1 TABLET: 875; 125 TABLET, FILM COATED ORAL at 14:06

## 2024-04-12 RX ADMIN — IPRATROPIUM BROMIDE AND ALBUTEROL SULFATE 9 ML: 2.5; .5 SOLUTION RESPIRATORY (INHALATION) at 11:38

## 2024-04-12 RX ADMIN — GUAIFENESIN 200 MG: 200 SOLUTION ORAL at 13:52

## 2024-04-12 ASSESSMENT — COLUMBIA-SUICIDE SEVERITY RATING SCALE - C-SSRS
1. IN THE PAST MONTH, HAVE YOU WISHED YOU WERE DEAD OR WISHED YOU COULD GO TO SLEEP AND NOT WAKE UP?: NO
6. HAVE YOU EVER DONE ANYTHING, STARTED TO DO ANYTHING, OR PREPARED TO DO ANYTHING TO END YOUR LIFE?: NO
2. HAVE YOU ACTUALLY HAD ANY THOUGHTS OF KILLING YOURSELF?: NO

## 2024-04-12 ASSESSMENT — LIFESTYLE VARIABLES
TOTAL SCORE: 0
EVER HAD A DRINK FIRST THING IN THE MORNING TO STEADY YOUR NERVES TO GET RID OF A HANGOVER: NO
HAVE YOU EVER FELT YOU SHOULD CUT DOWN ON YOUR DRINKING: NO
EVER FELT BAD OR GUILTY ABOUT YOUR DRINKING: NO
HAVE PEOPLE ANNOYED YOU BY CRITICIZING YOUR DRINKING: NO

## 2024-04-12 ASSESSMENT — PAIN SCALES - GENERAL: PAINLEVEL_OUTOF10: 0 - NO PAIN

## 2024-04-12 ASSESSMENT — PAIN - FUNCTIONAL ASSESSMENT: PAIN_FUNCTIONAL_ASSESSMENT: 0-10

## 2024-04-12 NOTE — ED PROVIDER NOTES
EMERGENCY DEPARTMENT ENCOUNTER      Pt Name: Marion Robles  MRN: [id number]  Birthdate 1945  Date of evaluation: 4/12/2024  Provider: William Martines MD    CHIEF COMPLAINT       Chief Complaint   Patient presents with    Shortness of Breath    Cough     Pt c/o cough and sob on exertion, has hx of copd         HISTORY OF PRESENT ILLNESS    HPI  Patient 78-year-old female with history of COPD, A-fib on Eliquis, HTN, HLD presenting with shortness of breath.  This is beginning progressively worse for several weeks.  She has had a worsening cough and had an episode of posttussive emesis this morning prompting her to come to the ER.  She has been having to use her nebulizer treatments frequently.  She notes runny nose but otherwise denies fever, chills, chest pain, nausea, vomiting, change in bowel or bladder.    Nursing Notes were reviewed.    PAST MEDICAL HISTORY   History reviewed. No pertinent past medical history.      SURGICAL HISTORY     History reviewed. No pertinent surgical history.      CURRENT MEDICATIONS       Discharge Medication List as of 4/12/2024  2:03 PM        CONTINUE these medications which have NOT CHANGED    Details   albuterol 2.5 mg /3 mL (0.083 %) nebulizer solution Take 3 mL (2.5 mg) by nebulization every 4 hours if needed for wheezing., Starting Thu 11/16/2023, Historical Med      albuterol 90 mcg/actuation inhaler Inhale 2 puffs every 6 hours if needed., Starting Mon 1/9/2023, Historical Med      !! apixaban (Eliquis) 5 mg tablet Take 1 tablet (5 mg) by mouth 2 times a day., Starting Tue 3/19/2024, Until Wed 3/19/2025, Normal      !! apixaban (Eliquis) 5 mg tablet Take 1 tablet (5 mg) by mouth 2 times a day for 28 days., Starting Tue 3/19/2024, Until Tue 4/16/2024, Sample      cetirizine (ZyrTEC) 10 mg tablet Take 1 tablet (10 mg) by mouth if needed for allergies., Historical Med      cholecalciferol (Vitamin D-3) 50,000 unit capsule Take 1 capsule (50,000 Units) by mouth 1 (one) time per  week., Starting Thu 4/4/2024, Normal      olopatadine-mometasone (Ryaltris) 665-25 mcg/spray spray,non-aerosol Administer 1 spray into affected nostril(s) 2 times a day., Starting Tue 3/19/2024, Sample      rosuvastatin (Crestor) 10 mg tablet Take 1 tablet (10 mg) by mouth once daily at bedtime., Starting Tue 3/19/2024, Until Sun 9/15/2024, Normal      Trelegy Ellipta 100-62.5-25 mcg blister with device Inhale 1 puff once daily., Starting Mon 7/17/2023, Historical Med      valsartan (Diovan) 160 mg tablet Take 1 tablet (160 mg) by mouth once daily., Starting Tue 3/19/2024, Until Wed 4/23/2025, Normal       !! - Potential duplicate medications found. Please discuss with provider.          ALLERGIES     Hydrocodone, Hydrocodone-acetaminophen, Oxycodone, Oxycodone-acetaminophen, and Propoxyphene    FAMILY HISTORY       Family History   Problem Relation Name Age of Onset    Other (malignant neoplasm of ovary) Mother            SOCIAL HISTORY       Social History     Socioeconomic History    Marital status: Single     Spouse name: None    Number of children: None    Years of education: None    Highest education level: None   Occupational History    None   Tobacco Use    Smoking status: Never     Passive exposure: Never    Smokeless tobacco: Never   Vaping Use    Vaping status: Never Used   Substance and Sexual Activity    Alcohol use: Never    Drug use: Never    Sexual activity: Defer   Other Topics Concern    None   Social History Narrative    None     Social Determinants of Health     Financial Resource Strain: Not on file   Food Insecurity: Not on file   Transportation Needs: Not on file   Physical Activity: Not on file   Stress: Not on file   Social Connections: Not on file   Intimate Partner Violence: Not on file   Housing Stability: Not on file       SCREENINGS                        PHYSICAL EXAM    (up to 7 for level 4, 8 or more for level 5)     ED Triage Vitals [04/12/24 1105]   Temperature Heart Rate  Respirations BP   36.6 °C (97.9 °F) 87 18 170/75      Pulse Ox Temp Source Heart Rate Source Patient Position   95 % Temporal Monitor Sitting      BP Location FiO2 (%)     Right arm --       Physical Exam  Constitutional:       Appearance: She is not toxic-appearing.      Comments: Appears uncomfortable   HENT:      Head: Normocephalic and atraumatic.      Mouth/Throat:      Pharynx: Oropharynx is clear.   Eyes:      Extraocular Movements: Extraocular movements intact.      Pupils: Pupils are equal, round, and reactive to light.   Cardiovascular:      Rate and Rhythm: Normal rate and regular rhythm.   Pulmonary:      Comments: Increased work of breathing, tripoding, diminished breath sounds with quiet expiratory wheeze diffusely bilaterally  Abdominal:      Palpations: Abdomen is soft.      Tenderness: There is no abdominal tenderness.   Musculoskeletal:      Cervical back: Normal range of motion and neck supple.      Right lower leg: No edema.      Left lower leg: No edema.   Skin:     General: Skin is warm and dry.   Neurological:      General: No focal deficit present.          DIAGNOSTIC RESULTS     LABS:  Labs Reviewed   CBC WITH AUTO DIFFERENTIAL - Abnormal       Result Value    WBC 3.4 (*)     nRBC 0.0      RBC 4.39      Hemoglobin 12.0      Hematocrit 40.7      MCV 93      MCH 27.3      MCHC 29.5 (*)     RDW 14.6 (*)     Platelets 161      Neutrophils % 67.2      Immature Granulocytes %, Automated 0.0      Lymphocytes % 25.0      Monocytes % 6.0      Eosinophils % 1.5      Basophils % 0.3      Neutrophils Absolute 2.26      Immature Granulocytes Absolute, Automated 0.00      Lymphocytes Absolute 0.84      Monocytes Absolute 0.20      Eosinophils Absolute 0.05      Basophils Absolute 0.01     COMPREHENSIVE METABOLIC PANEL - Abnormal    Glucose 112 (*)     Sodium 142      Potassium 3.9      Chloride 107      Bicarbonate 28      Anion Gap 11      Urea Nitrogen 24 (*)     Creatinine 0.91      eGFR 66       Calcium 9.1      Albumin 4.1      Alkaline Phosphatase 47      Total Protein 6.3 (*)     AST 18      Bilirubin, Total 0.6      ALT 17     B-TYPE NATRIURETIC PEPTIDE - Abnormal     (*)     Narrative:        <100 pg/mL - Heart failure unlikely  100-299 pg/mL - Intermediate probability of acute heart                  failure exacerbation. Correlate with clinical                  context and patient history.    >=300 pg/mL - Heart Failure likely. Correlate with clinical                  context and patient history.    BNP testing is performed using different testing methodology at [facility] than at other [facility] hospitals. Direct result comparisons should only be made within the same method.      SERIAL TROPONIN-INITIAL - Abnormal    Troponin I, High Sensitivity 30 (*)     Narrative:     Less than 99th percentile of normal range cutoff-  Female and children under 18 years old <14 ng/L; Male <21 ng/L: Negative  Repeat testing should be performed if clinically indicated.     Female and children under 18 years old 14-50 ng/L; Male 21-50 ng/L:  Consistent with possible cardiac damage and possible increased clinical   risk. Serial measurements may help to assess extent of myocardial damage.     >50 ng/L: Consistent with cardiac damage, increased clinical risk and  myocardial infarction. Serial measurements may help assess extent of   myocardial damage.      NOTE: Children less than 1 year old may have higher baseline troponin   levels and results should be interpreted in conjunction with the overall   clinical context.     NOTE: Troponin I testing is performed using a different   testing methodology at [facility] than at other   [facility]. Direct result comparisons should only   be made within the same method.   SERIAL TROPONIN, 1 HOUR - Abnormal    Troponin I, High Sensitivity 27 (*)     Narrative:     Less than 99th percentile of normal range cutoff-  Female and children under 18  years old <14 ng/L; Male <21 ng/L: Negative  Repeat testing should be performed if clinically indicated.     Female and children under 18 years old 14-50 ng/L; Male 21-50 ng/L:  Consistent with possible cardiac damage and possible increased clinical   risk. Serial measurements may help to assess extent of myocardial damage.     >50 ng/L: Consistent with cardiac damage, increased clinical risk and  myocardial infarction. Serial measurements may help assess extent of   myocardial damage.      NOTE: Children less than 1 year old may have higher baseline troponin   levels and results should be interpreted in conjunction with the overall   clinical context.     NOTE: Troponin I testing is performed using a different   testing methodology at [facility] than at other   [facility]. Direct result comparisons should only   be made within the same method.   D-DIMER, VTE EXCLUSION - Abnormal    D-Dimer, Quantitative VTE Exclusion 584 (*)     Narrative:     The VTE Exclusion D-Dimer assay is reported in ng/mL Fibrinogen Equivalent Units (FEU).    Per 's instructions for use, a value of less than 500 ng/mL (FEU) may help to exclude DVT or PE in outpatients when the assay is used with a clinical pretest probability assessment.(AEMR must utilize and document eCalc 'Wells Score Deep Vein Thrombosis Risk' for DVT exclusion only. Emergency Department should utilize  Guidelines for Emergency Department Use of the VTE Exclusion D-Dimer and Clinical Pretest probability assessment model for DVT or PE exclusion.)   SARS-COV-2 AND INFLUENZA A/B PCR - Normal    Flu A Result Not Detected      Flu B Result Not Detected      Coronavirus 2019, PCR Not Detected      Narrative:     This assay has received FDA Emergency Use Authorization (EUA) and  is only authorized for the duration of time that circumstances exist to justify the authorization of the emergency use of in vitro diagnostic tests for the detection of  SARS-CoV-2 virus and/or diagnosis of COVID-19 infection under section 564(b)(1) of the Act, 21 U.S.C. 360bbb-3(b)(1). Testing for SARS-CoV-2 is only recommended for patients who meet current clinical and/or epidemiological criteria as defined by federal, state, or local public health directives. This assay is an in vitro diagnostic nucleic acid amplification test for the qualitative detection of SARS-CoV-2, Influenza A, and Influenza B from nasopharyngeal specimens and has been validated for use at [facility]. Negative results do not preclude COVID-19 infections or Influenza A/B infections, and should not be used as the sole basis for diagnosis, treatment, or other management decisions. If Influenza A/B and RSV PCR results are negative, testing for Parainfluenza virus, Adenovirus and Metapneumovirus is routinely performed for [facility] pediatric oncology and intensive care inpatients, and is available on other patients by placing an add-on request.    RSV PCR - Normal    RSV PCR Not Detected      Narrative:     This assay is an FDA-cleared, in vitro diagnostic nucleic acid amplification test for the detection of RSV from nasopharyngeal specimens, and has been validated for use at [facility]. Negative results do not preclude RSV infections, and should not be used as the sole basis for diagnosis, treatment, or other management decisions. If Influenza A/B and RSV PCR results are negative, testing for Parainfluenza virus, Adenovirus and Metapneumovirus is routinely performed for pediatric oncology and intensive care inpatients at [facility], and is available on other patients by placing an add-on request.       TROPONIN SERIES- (INITIAL, 1 HR)    Narrative:     The following orders were created for panel order Troponin I Series, High Sensitivity (0, 1 HR).  Procedure                               Abnormality         Status                     ---------                                -----------         ------                     Troponin I, High Sensiti...[[id number]]  Abnormal            Final result               Troponin, High Sensitivi...[[id number]]  Abnormal            Final result                 Please view results for these tests on the individual orders.   PROTIME-INR   PROCALCITONIN   PROCALCITONIN       All other labs were within normal range or not returned as of this dictation.    Imaging  XR chest 1 view   Final Result   1. Mild generalized increased interstitial prominence component which   is likely chronic. However, superimposed component of mild left   basilar infiltrate/atelectasis of concern.             MACRO:   None        Signed by: Logan Olivas 4/12/2024 11:50 AM   Dictation workstation:   [id number]           Procedures  Procedures     EMERGENCY DEPARTMENT COURSE/MDM:     Diagnoses as of 04/12/24 1740   COPD exacerbation (Multi)   Pneumonia of left lower lobe due to infectious organism        Medical Decision Making  History obtained from the patient and her daughter.  Records including labs, imaging, notes reviewed.  Presentation concerning for possible COPD exacerbation, will URI, pneumonia, pulmonary embolism.  Dimer elevated at 584 but can be age-adjusted out; CT pulmonary embolism study was deferred.  Viral swabs returned negative for RSV, flu, COVID.  CBC with leukopenia 3.4.  CMP with elevated BUN of 24.  Troponin elevated at 30 then down to 27 with no acute injury pattern on EKG.  BNP elevated at 377 but no evidence of fluid overload on chest x-ray.  Chest x-ray could not rule out a superimposed left basilar pneumonia given her overall symptomology, the decision was made to treat her empirically with azithromycin and Augmentin.  Patient was given 3 stacked duo nebulizer treatment and Solu-Medrol with improvement.  She subsequently passed ambulatory pulse ox test.  She was discharged home in satisfactory condition with prescriptions for prednisone,  azithromycin, and Augmentin.  All questions answered and return precautions discussed.    EKG demonstrated rate controlled A-fib at 83 bpm, QTc of 423.  No acute injury pattern.    Patient and or family in agreement and understanding of treatment plan.  All questions answered.      I reviewed the case with the attending ED physician. The attending ED physician agrees with the plan. Patient and/or patient´s representative was counseled regarding labs, imaging, likely diagnosis, and plan. All questions were answered.    ED Medications administered this visit:    Medications   ipratropium-albuteroL (Duo-Neb) 0.5-2.5 mg/3 mL nebulizer solution 9 mL (9 mL nebulization Given 4/12/24 1138)   methylPREDNISolone sod succinate (SOLU-Medrol) injection 125 mg (125 mg intravenous Given 4/12/24 1137)   guaiFENesin (Robitussin) 100 mg/5 mL syrup 200 mg (200 mg oral Given 4/12/24 1352)   amoxicillin-pot clavulanate (Augmentin) 875-125 mg per tablet 1 tablet (1 tablet oral Given 4/12/24 1406)   azithromycin (Zithromax) tablet 500 mg (500 mg oral Given 4/12/24 1406)       New Prescriptions from this visit:    Discharge Medication List as of 4/12/2024  2:03 PM        START taking these medications    Details   amoxicillin-pot clavulanate (Augmentin) 875-125 mg tablet Take 1 tablet by mouth every 12 hours for 5 days., Starting Fri 4/12/2024, Until Wed 4/17/2024, Normal      azithromycin (Zithromax) 500 mg tablet Take 1 tablet (500 mg) by mouth once daily for 5 days., Starting Fri 4/12/2024, Until Wed 4/17/2024, Normal      predniSONE (Deltasone) 50 mg tablet Take 1 tablet (50 mg) by mouth once daily for 5 days., Starting Fri 4/12/2024, Until Wed 4/17/2024, Normal             Follow-up:  [place] Gutierrez [place]  [id number] [facility] [address] [phone]      As needed        Final Impression:   1. COPD exacerbation (Multi)    2. Pneumonia of left lower lobe due to infectious organism          (Please note that portions of this  note were completed with a voice recognition program.  Efforts were made to edit the dictations but occasionally words are mis-transcribed.)     William Martines MD  Resident  04/12/24 9239

## 2024-04-12 NOTE — DISCHARGE INSTRUCTIONS
You were evaluated for shortness of breath.  This is likely due to exacerbation of your COPD.  Being said, you may have an underlying pneumonia.  You should take 50 mg of prednisone daily for the next 5 days.  I recommend he take this in the morning as it can cause insomnia.  Also take azithromycin and Augmentin as prescribed for the possible pneumonia.  If you develop worsening shortness of breath despite this, chest pain, or other worrisome symptoms, return to the ER.

## 2024-04-13 LAB — PROCALCITONIN SERPL-MCNC: 0.02 NG/ML

## 2024-04-23 ENCOUNTER — APPOINTMENT (OUTPATIENT)
Dept: PRIMARY CARE | Facility: CLINIC | Age: 79
End: 2024-04-23
Payer: MEDICARE

## 2024-04-25 ENCOUNTER — LAB (OUTPATIENT)
Dept: LAB | Facility: LAB | Age: 79
End: 2024-04-25
Payer: MEDICARE

## 2024-04-25 ENCOUNTER — HOSPITAL ENCOUNTER (OUTPATIENT)
Dept: RADIOLOGY | Facility: CLINIC | Age: 79
Discharge: HOME | End: 2024-04-25
Payer: MEDICARE

## 2024-04-25 DIAGNOSIS — R09.02 HYPOXEMIA: ICD-10-CM

## 2024-04-25 DIAGNOSIS — I48.91 NEW ONSET A-FIB (MULTI): ICD-10-CM

## 2024-04-25 DIAGNOSIS — I10 PRIMARY HYPERTENSION: ICD-10-CM

## 2024-04-25 DIAGNOSIS — R06.09 DYSPNEA ON EXERTION: ICD-10-CM

## 2024-04-25 DIAGNOSIS — E78.2 MIXED HYPERLIPIDEMIA: ICD-10-CM

## 2024-04-25 DIAGNOSIS — I31.39 PERICARDIAL EFFUSION (HHS-HCC): ICD-10-CM

## 2024-04-25 LAB — CRP SERPL HS-MCNC: 5.5 MG/L

## 2024-04-25 PROCEDURE — 86141 C-REACTIVE PROTEIN HS: CPT

## 2024-04-25 PROCEDURE — 36415 COLL VENOUS BLD VENIPUNCTURE: CPT

## 2024-04-25 PROCEDURE — 71250 CT THORAX DX C-: CPT

## 2024-04-25 PROCEDURE — 71250 CT THORAX DX C-: CPT | Performed by: RADIOLOGY

## 2024-04-25 PROCEDURE — 86038 ANTINUCLEAR ANTIBODIES: CPT

## 2024-04-26 LAB — ANA SER QL HEP2 SUBST: NEGATIVE

## 2024-05-01 PROBLEM — R09.02 HYPOXIA: Status: ACTIVE | Noted: 2024-05-01

## 2024-05-01 PROBLEM — F44.89 CONFUSIONAL STATE: Status: ACTIVE | Noted: 2024-05-01

## 2024-05-01 PROBLEM — R91.8 MULTIPLE PULMONARY NODULES: Status: ACTIVE | Noted: 2024-05-01

## 2024-05-01 PROBLEM — R91.8 ABNORMAL FINDINGS ON DIAGNOSTIC IMAGING OF LUNG: Status: ACTIVE | Noted: 2023-08-15

## 2024-05-01 PROBLEM — E78.00 PURE HYPERCHOLESTEROLEMIA: Status: ACTIVE | Noted: 2024-03-19

## 2024-05-01 PROBLEM — E74.89 DISORDER OF GLUCOSE METABOLISM (MULTI): Status: ACTIVE | Noted: 2024-05-01

## 2024-05-01 PROBLEM — G89.29 CHRONIC HEADACHE DISORDER: Status: ACTIVE | Noted: 2024-05-01

## 2024-05-01 PROBLEM — R51.9 CHRONIC HEADACHE DISORDER: Status: ACTIVE | Noted: 2024-05-01

## 2024-05-01 PROBLEM — R41.3 AMNESIA: Status: ACTIVE | Noted: 2024-05-01

## 2024-05-01 PROBLEM — L97.109: Status: ACTIVE | Noted: 2024-03-19

## 2024-05-01 PROBLEM — L60.3 DYSTROPHIA UNGUIUM: Status: ACTIVE | Noted: 2024-05-01

## 2024-05-01 PROBLEM — J44.9 CHRONIC OBSTRUCTIVE PULMONARY DISEASE (MULTI): Status: ACTIVE | Noted: 2024-05-01

## 2024-05-01 PROBLEM — J18.9 PNEUMONIA DUE TO INFECTIOUS ORGANISM: Status: ACTIVE | Noted: 2024-05-01

## 2024-05-01 PROBLEM — R42 DIZZINESS: Status: ACTIVE | Noted: 2023-08-15

## 2024-05-01 PROBLEM — R05.9 COUGH: Status: ACTIVE | Noted: 2024-05-01

## 2024-05-01 PROBLEM — I73.9 PERIPHERAL VASCULAR DISEASE (CMS-HCC): Status: ACTIVE | Noted: 2024-03-18

## 2024-05-01 PROBLEM — L03.116 CELLULITIS OF LEFT THIGH: Status: ACTIVE | Noted: 2024-05-01

## 2024-05-02 ENCOUNTER — OFFICE VISIT (OUTPATIENT)
Dept: PRIMARY CARE | Facility: CLINIC | Age: 79
End: 2024-05-02
Payer: MEDICARE

## 2024-05-02 VITALS
BODY MASS INDEX: 30.73 KG/M2 | TEMPERATURE: 97.2 F | SYSTOLIC BLOOD PRESSURE: 128 MMHG | HEIGHT: 62 IN | HEART RATE: 80 BPM | WEIGHT: 167 LBS | DIASTOLIC BLOOD PRESSURE: 70 MMHG

## 2024-05-02 DIAGNOSIS — J44.9 CHRONIC OBSTRUCTIVE PULMONARY DISEASE, UNSPECIFIED COPD TYPE (MULTI): ICD-10-CM

## 2024-05-02 DIAGNOSIS — I48.91 ATRIAL FIBRILLATION, UNSPECIFIED TYPE (MULTI): ICD-10-CM

## 2024-05-02 DIAGNOSIS — I10 BENIGN HYPERTENSION: ICD-10-CM

## 2024-05-02 DIAGNOSIS — R60.0 LOWER EXTREMITY EDEMA: Primary | ICD-10-CM

## 2024-05-02 LAB
ATRIAL RATE: 26 BPM
Q ONSET: 228 MS
QRS COUNT: 13 BEATS
QRS DURATION: 48 MS
QT INTERVAL: 360 MS
QTC CALCULATION(BAZETT): 423 MS
QTC FREDERICIA: 401 MS
R AXIS: -80 DEGREES
T AXIS: 182 DEGREES
T OFFSET: 408 MS
VENTRICULAR RATE: 83 BPM

## 2024-05-02 PROCEDURE — 1157F ADVNC CARE PLAN IN RCRD: CPT | Performed by: FAMILY MEDICINE

## 2024-05-02 PROCEDURE — 1036F TOBACCO NON-USER: CPT | Performed by: FAMILY MEDICINE

## 2024-05-02 PROCEDURE — 3074F SYST BP LT 130 MM HG: CPT | Performed by: FAMILY MEDICINE

## 2024-05-02 PROCEDURE — 1160F RVW MEDS BY RX/DR IN RCRD: CPT | Performed by: FAMILY MEDICINE

## 2024-05-02 PROCEDURE — 1159F MED LIST DOCD IN RCRD: CPT | Performed by: FAMILY MEDICINE

## 2024-05-02 PROCEDURE — 99214 OFFICE O/P EST MOD 30 MIN: CPT | Performed by: FAMILY MEDICINE

## 2024-05-02 PROCEDURE — 3078F DIAST BP <80 MM HG: CPT | Performed by: FAMILY MEDICINE

## 2024-05-02 RX ORDER — FUROSEMIDE 20 MG/1
20 TABLET ORAL DAILY
Qty: 30 TABLET | Refills: 0 | Status: SHIPPED | OUTPATIENT
Start: 2024-05-02 | End: 2024-05-24 | Stop reason: ALTCHOICE

## 2024-05-02 ASSESSMENT — PATIENT HEALTH QUESTIONNAIRE - PHQ9
6. FEELING BAD ABOUT YOURSELF - OR THAT YOU ARE A FAILURE OR HAVE LET YOURSELF OR YOUR FAMILY DOWN: NOT AT ALL
3. TROUBLE FALLING OR STAYING ASLEEP OR SLEEPING TOO MUCH: NEARLY EVERY DAY
10. IF YOU CHECKED OFF ANY PROBLEMS, HOW DIFFICULT HAVE THESE PROBLEMS MADE IT FOR YOU TO DO YOUR WORK, TAKE CARE OF THINGS AT HOME, OR GET ALONG WITH OTHER PEOPLE: SOMEWHAT DIFFICULT
SUM OF ALL RESPONSES TO PHQ9 QUESTIONS 1 AND 2: 6
7. TROUBLE CONCENTRATING ON THINGS, SUCH AS READING THE NEWSPAPER OR WATCHING TELEVISION: SEVERAL DAYS
4. FEELING TIRED OR HAVING LITTLE ENERGY: MORE THAN HALF THE DAYS
2. FEELING DOWN, DEPRESSED OR HOPELESS: NEARLY EVERY DAY
SUM OF ALL RESPONSES TO PHQ QUESTIONS 1-9: 16
8. MOVING OR SPEAKING SO SLOWLY THAT OTHER PEOPLE COULD HAVE NOTICED. OR THE OPPOSITE, BEING SO FIGETY OR RESTLESS THAT YOU HAVE BEEN MOVING AROUND A LOT MORE THAN USUAL: NEARLY EVERY DAY
5. POOR APPETITE OR OVEREATING: SEVERAL DAYS
9. THOUGHTS THAT YOU WOULD BE BETTER OFF DEAD, OR OF HURTING YOURSELF: NOT AT ALL
1. LITTLE INTEREST OR PLEASURE IN DOING THINGS: NEARLY EVERY DAY

## 2024-05-02 NOTE — PROGRESS NOTES
"    /70   Pulse 80   Temp 36.2 °C (97.2 °F)   Ht 1.575 m (5' 2\")   Wt 75.8 kg (167 lb)   BMI 30.54 kg/m²     No past medical history on file.    Patient Active Problem List   Diagnosis    Onychomycosis    Peripheral vascular disease (CMS-HCC)    Dyspnea on exertion    Vitamin D deficiency    Allergic rhinitis    Thyroid disorder screening    Health maintenance examination    Skin ulcer, limited to breakdown of skin (Multi)    Skin lesion    Mixed hyperlipidemia    Benign hypertension    Atrial fibrillation (Multi)    Menopause present    Abnormal ECG    Abnormal findings on diagnostic imaging of lung    Amnesia    Cellulitis of left thigh    Chronic headache disorder    Chronic obstructive pulmonary disease (Multi)    Confusional state    Cough    Disorder of glucose metabolism (Multi)    Dizziness    Dystrophia unguium    Hypoxia    Multiple pulmonary nodules    Pneumonia due to infectious organism    Pure hypercholesterolemia    Ulcer of thigh (Multi)       Current Outpatient Medications   Medication Sig Dispense Refill    albuterol 2.5 mg /3 mL (0.083 %) nebulizer solution Take 3 mL (2.5 mg) by nebulization every 4 hours if needed for wheezing.      albuterol 90 mcg/actuation inhaler Inhale 2 puffs every 6 hours if needed.      apixaban (Eliquis) 5 mg tablet Take 1 tablet (5 mg) by mouth 2 times a day. 60 tablet 11    cetirizine (ZyrTEC) 10 mg tablet Take 1 tablet (10 mg) by mouth if needed for allergies.      cholecalciferol (Vitamin D-3) 50,000 unit capsule Take 1 capsule (50,000 Units) by mouth 1 (one) time per week. 12 capsule 1    olopatadine-mometasone (Ryaltris) 665-25 mcg/spray spray,non-aerosol Administer 1 spray into affected nostril(s) 2 times a day. 29 g 0    rosuvastatin (Crestor) 10 mg tablet Take 1 tablet (10 mg) by mouth once daily at bedtime. 90 tablet 1    Trelegy Ellipta 100-62.5-25 mcg blister with device Inhale 1 puff once daily.      valsartan (Diovan) 160 mg tablet Take 1 tablet " (160 mg) by mouth once daily. 100 tablet 3    apixaban (Eliquis) 5 mg tablet Take 1 tablet (5 mg) by mouth 2 times a day for 28 days. (Patient not taking: Reported on 4/9/2024) 56 tablet 0     No current facility-administered medications for this visit.       CC/HPI/ASSESSMENT/PLAN    CC lower extremity swelling    HPI patient 78-year-old female notes over the past week she is experiencing swelling of her lower extremities and both legs.  Patient recently diagnosed with atrial fibrillation.  Suffers from hypertension.  Patient here with her daughter who notes patient is not compliant with taking her medications.  She takes her medicines maybe 3 days a week.  She does not take her breathing medicine for her COPD as well.  She notes she is dealing with some shortness of breath.  She denies fever chills chest pain.  Patient eats a lot of prepackaged foods that are high in sodium    Exam calm neck supple lungs good air exchange's CV irregularly irregular Ext 1-2+ pitting edema bilaterally no evidence of infection    A/P 1 lower extremity edema 2 atrial fibrillation chronic 3 hypertension stable 4 COPD chronic.  Patient will start Lasix 20 mg every morning.  I have advised her that she needs to take her medication every day as prescribed or she runs the risk of having a stroke.  I told her she needs to take her Trelegy inhaler so that she can breathe better.  We discussed decreasing sodium intake by avoiding prepackaged foods.  She will take Lasix 20 mg in the morning.  She has follow-up with cardiology next week.  If symptoms worsen she will go to the ER for evaluation.  Patient daughter here throughout exam and conversation    There are no diagnoses linked to this encounter.

## 2024-05-07 ENCOUNTER — APPOINTMENT (OUTPATIENT)
Dept: CARDIOLOGY | Facility: CLINIC | Age: 79
End: 2024-05-07
Payer: MEDICARE

## 2024-05-08 ENCOUNTER — OFFICE VISIT (OUTPATIENT)
Dept: CARDIOLOGY | Facility: CLINIC | Age: 79
End: 2024-05-08
Payer: MEDICARE

## 2024-05-08 ENCOUNTER — OFFICE VISIT (OUTPATIENT)
Dept: PRIMARY CARE | Facility: CLINIC | Age: 79
End: 2024-05-08
Payer: MEDICARE

## 2024-05-08 VITALS
TEMPERATURE: 97.7 F | HEART RATE: 96 BPM | HEIGHT: 62 IN | DIASTOLIC BLOOD PRESSURE: 82 MMHG | SYSTOLIC BLOOD PRESSURE: 130 MMHG | BODY MASS INDEX: 30 KG/M2 | WEIGHT: 163 LBS

## 2024-05-08 VITALS
SYSTOLIC BLOOD PRESSURE: 130 MMHG | HEART RATE: 96 BPM | BODY MASS INDEX: 30 KG/M2 | WEIGHT: 163 LBS | TEMPERATURE: 97 F | DIASTOLIC BLOOD PRESSURE: 82 MMHG | HEIGHT: 62 IN

## 2024-05-08 DIAGNOSIS — I48.91 ATRIAL FIBRILLATION, UNSPECIFIED TYPE (MULTI): ICD-10-CM

## 2024-05-08 DIAGNOSIS — I10 BENIGN HYPERTENSION: ICD-10-CM

## 2024-05-08 DIAGNOSIS — J44.9 CHRONIC OBSTRUCTIVE PULMONARY DISEASE, UNSPECIFIED COPD TYPE (MULTI): Primary | ICD-10-CM

## 2024-05-08 DIAGNOSIS — E78.2 MIXED HYPERLIPIDEMIA: Primary | ICD-10-CM

## 2024-05-08 DIAGNOSIS — I31.39 PERICARDIAL EFFUSION (HHS-HCC): ICD-10-CM

## 2024-05-08 PROCEDURE — 1160F RVW MEDS BY RX/DR IN RCRD: CPT | Performed by: INTERNAL MEDICINE

## 2024-05-08 PROCEDURE — 3079F DIAST BP 80-89 MM HG: CPT | Performed by: INTERNAL MEDICINE

## 2024-05-08 PROCEDURE — 3075F SYST BP GE 130 - 139MM HG: CPT | Performed by: INTERNAL MEDICINE

## 2024-05-08 PROCEDURE — 1125F AMNT PAIN NOTED PAIN PRSNT: CPT | Performed by: INTERNAL MEDICINE

## 2024-05-08 PROCEDURE — 1159F MED LIST DOCD IN RCRD: CPT | Performed by: INTERNAL MEDICINE

## 2024-05-08 PROCEDURE — 99214 OFFICE O/P EST MOD 30 MIN: CPT | Performed by: INTERNAL MEDICINE

## 2024-05-08 PROCEDURE — 1157F ADVNC CARE PLAN IN RCRD: CPT | Performed by: INTERNAL MEDICINE

## 2024-05-08 PROCEDURE — 1036F TOBACCO NON-USER: CPT | Performed by: INTERNAL MEDICINE

## 2024-05-08 PROCEDURE — 99213 OFFICE O/P EST LOW 20 MIN: CPT | Performed by: INTERNAL MEDICINE

## 2024-05-08 RX ORDER — FLUTICASONE FUROATE, UMECLIDINIUM BROMIDE AND VILANTEROL TRIFENATATE 100; 62.5; 25 UG/1; UG/1; UG/1
1 POWDER RESPIRATORY (INHALATION) DAILY
Qty: 90 EACH | Refills: 1 | Status: SHIPPED | OUTPATIENT
Start: 2024-05-08 | End: 2024-11-04

## 2024-05-08 ASSESSMENT — PAIN SCALES - GENERAL: PAINLEVEL: 10-WORST PAIN EVER

## 2024-05-08 NOTE — PROGRESS NOTES
1.  New onset atrial fibrillation  2.  Small pericardial effusion, unclear etiology  3.  Hypertension  4.  Hyperlipidemia  5.  Noncompliant with medical advice  6.  Memory deficits  7.  Overweight    Patient is a pleasant 28-year-old female with the above-noted pertinent past medical history who presents today for follow-up.  She is accompanied by her daughter who provides some history, reportedly patient has been noncompliant with her Eliquis in spite of agreeing to remain compliant, she also remains noncompliant with her low-sodium diet, today she complains worsening of the lower extremity edema per daughter patient consumes mostly pre-package processed food, she has no complaints orthopnea PND or palpitation    BMI is elevated at 29.8, blood pressure 130/82 mmHg and a heart rate of 96 bpm, prior heart rate range 69-84 bpm, well-developed well-nourished female in no acute distress no jugular venous distention heart irregularly irregular S1 variable S2 is normal no gallop or murmurs appreciated lungs are clear to auscultation abdomen is obese positive bowel sounds soft and nontender and there is at least a moderate pretibial edema extending to the knees bilaterally.    CRP elevated at 5.5  LUDA negative    New onset atrial fibrillation who remains noncompliant with Eliquis the significance compliance with medication in order to avoid CVA was discussed in details was provided to the patient and family.  Plan elective cardioversion after 3 weeks of anticoagulation  Lower extremity edema in setting of noncompliant with medication and diet, low-sodium diet was discussed and recommended and details provided patient is to increase the furosemide 40 mg for 4 days and then return to her usual dose of 20 mg daily.  Pericardial effusion of unclear etiology, patient is currently completely asymptomatic, volume adjustment as noted above was noted repeat echocardiogram in September 2024 is recommended which would be  approximately 6 months from the prior echocardiogram  Patient is to return to my clinic in 3 months for follow-up.

## 2024-05-08 NOTE — PROGRESS NOTES
"Subjective   Patient ID: Marion Robles is a 78 y.o. female who presents for Follow-up.    HPI patient is a 78-year-old female past medical history of Peripheral vascular disease, hypertension hyperlipidemia chronic obstructive pulmonary disease, hypoxia, respiratory failure presents to the office today for follow-up.  She is seeing cardiology today and also scheduled to see pulmonology next week.  She is accompanied by her daughter who helps provide history.  Patient noncompliant with Eliquis.  Noncompliant with low-sodium diet and complains of low extremity edema.  Patient denies any chest pain, nausea or vomiting.  She was also recently seen by my partner Dr. Harvey for leg swelling and diagnosed with atrial fibrillation.  She does not take her medication for COPD as well.  We discussed giving her samples of Trelegy.  She was started on Lasix on 5/2/2024 when she saw my partner Dr. Otero.  She was told that time to go to the ER for evaluation of her symptoms would worsen.    Review of Systems   Constitutional:  Negative for chills and fever.   HENT:  Negative for sore throat.    Eyes:  Negative for visual disturbance.   Respiratory:  Positive for shortness of breath. Negative for cough.    Cardiovascular:  Positive for leg swelling. Negative for chest pain and palpitations.   Gastrointestinal:  Negative for abdominal pain, blood in stool, diarrhea, nausea and vomiting.   Genitourinary:  Negative for dysuria.   Skin:  Negative for rash.   Neurological:  Negative for dizziness, syncope and light-headedness.   Psychiatric/Behavioral:  Negative for agitation.        Objective   /82   Pulse 96   Temp 36.1 °C (97 °F) (Temporal)   Ht 1.575 m (5' 2\")   Wt 73.9 kg (163 lb)   BMI 29.81 kg/m²     Physical Exam  Vitals and nursing note reviewed.   Constitutional:       General: She is not in acute distress.     Appearance: She is not toxic-appearing or diaphoretic.   HENT:      Head: Normocephalic and " atraumatic.      Mouth/Throat:      Mouth: Mucous membranes are moist.      Pharynx: Oropharynx is clear.   Eyes:      Pupils: Pupils are equal, round, and reactive to light.   Cardiovascular:      Rate and Rhythm: Normal rate. Rhythm irregular.   Pulmonary:      Effort: Pulmonary effort is normal. No respiratory distress.      Breath sounds: Normal breath sounds. No stridor. No wheezing.   Musculoskeletal:      Right lower leg: Edema present.      Left lower leg: Edema present.   Skin:     General: Skin is warm and dry.      Coloration: Skin is not jaundiced or pale.      Findings: No rash.   Neurological:      General: No focal deficit present.      Mental Status: She is alert. Mental status is at baseline.   Psychiatric:         Mood and Affect: Mood normal.         Behavior: Behavior normal.         Assessment/Plan   Problem List Items Addressed This Visit             ICD-10-CM    Atrial fibrillation (Multi) I48.91    Chronic obstructive pulmonary disease (Multi) - Primary J44.9    Relevant Medications    Trelegy Ellipta 100-62.5-25 mcg blister with device    Lower extremity edema R60.0     COPD: We have provided her with Trelegy and have advised her to follow-up with pulmonology for further evaluation and treatment    atrial fibrillation: Currently following with Dr. Jerrell GONCALVES cardiology is planning for possible cardioversion.  Patient on anticoagulation and has been noncompliant.  Explained the importance of compliance    Lower extremity edema: Likely secondary to atrial fibrillation and fluid volume.  Low-sodium diet recommended.  Will defer diuretic therapy to cardiology

## 2024-05-23 ENCOUNTER — TELEPHONE (OUTPATIENT)
Dept: CARDIOLOGY | Facility: CLINIC | Age: 79
End: 2024-05-23
Payer: MEDICARE

## 2024-05-24 ENCOUNTER — OFFICE VISIT (OUTPATIENT)
Dept: CARDIOLOGY | Facility: CLINIC | Age: 79
End: 2024-05-24
Payer: MEDICARE

## 2024-05-24 VITALS
DIASTOLIC BLOOD PRESSURE: 84 MMHG | WEIGHT: 166 LBS | HEART RATE: 58 BPM | TEMPERATURE: 97.5 F | HEIGHT: 62 IN | SYSTOLIC BLOOD PRESSURE: 130 MMHG | BODY MASS INDEX: 30.55 KG/M2

## 2024-05-24 DIAGNOSIS — I48.91 ATRIAL FIBRILLATION, UNSPECIFIED TYPE (MULTI): ICD-10-CM

## 2024-05-24 DIAGNOSIS — I10 BENIGN HYPERTENSION: Primary | ICD-10-CM

## 2024-05-24 DIAGNOSIS — R60.0 BILATERAL LEG EDEMA: ICD-10-CM

## 2024-05-24 PROCEDURE — 99213 OFFICE O/P EST LOW 20 MIN: CPT | Performed by: INTERNAL MEDICINE

## 2024-05-24 PROCEDURE — 1036F TOBACCO NON-USER: CPT | Performed by: INTERNAL MEDICINE

## 2024-05-24 PROCEDURE — 1159F MED LIST DOCD IN RCRD: CPT | Performed by: INTERNAL MEDICINE

## 2024-05-24 PROCEDURE — 1157F ADVNC CARE PLAN IN RCRD: CPT | Performed by: INTERNAL MEDICINE

## 2024-05-24 PROCEDURE — 3075F SYST BP GE 130 - 139MM HG: CPT | Performed by: INTERNAL MEDICINE

## 2024-05-24 PROCEDURE — 3079F DIAST BP 80-89 MM HG: CPT | Performed by: INTERNAL MEDICINE

## 2024-05-24 PROCEDURE — 1160F RVW MEDS BY RX/DR IN RCRD: CPT | Performed by: INTERNAL MEDICINE

## 2024-05-24 PROCEDURE — 1125F AMNT PAIN NOTED PAIN PRSNT: CPT | Performed by: INTERNAL MEDICINE

## 2024-05-24 RX ORDER — TORSEMIDE 20 MG/1
20 TABLET ORAL DAILY
Qty: 30 TABLET | Refills: 1 | Status: SHIPPED | OUTPATIENT
Start: 2024-05-24 | End: 2024-07-23

## 2024-05-24 ASSESSMENT — PAIN SCALES - GENERAL: PAINLEVEL: 10-WORST PAIN EVER

## 2024-05-24 NOTE — PROGRESS NOTES
1.  New onset atrial fibrillation  2.  Small pericardial effusion, unclear etiology  3.  Hypertension  4.  Hyperlipidemia  5.  Noncompliant with medical advice  6.  Memory deficits  7.  Overweight        Patient is a pleasant 78-year-old female with the above-noted pertinent past medical history who presents today for follow-up.  On May 8, 2024 patient was seen in my office with complaints of lower extremity edema at which time her Lasix was increased for 4 days prior she present today states that her lower extremity after increase in Lasix doses improve yet at became edematous again, she is accompanied by her daughter today who states that she has been compliant with her medication and low sodium diet, she has no complaints of orthopnea PND.    Vital signs reviewed and stable blood pressure 130/84 mmHg and heart rate of 58 bpm elderly female accompanied by her daughter heart irregularly irregular S1 variable S2 is normal no gallop or murmurs lungs are clear to auscultation in all fields neck no jugular venous distention lower extremity at least a moderate edema bilaterally to the knees is present,     4/2024  Sodium 142, potassium 3.9, BUN 24, creatinine 0.91    Lower extremity edema in the setting of the new onset atrial fibrillation who responded to increasing dose of Lasix now with recurrent on the lower dose of Lasix patient is recommended to discontinue Lasix and to start torsemide 20 mg 1 tablet daily medication was sent to the local pharmacy, patient encouraged to stay compliant with a low-sodium diet and to have her legs elevated when sitting, her questions regarding medication was answered patient is to have basic metabolic profile obtained in about 1 week after starting the medication patient is also requested to return to my clinic again in 2 weeks for reevaluation.

## 2024-06-03 ENCOUNTER — LAB (OUTPATIENT)
Dept: LAB | Facility: LAB | Age: 79
End: 2024-06-03
Payer: MEDICARE

## 2024-06-03 DIAGNOSIS — R60.0 BILATERAL LEG EDEMA: ICD-10-CM

## 2024-06-03 DIAGNOSIS — I48.91 ATRIAL FIBRILLATION, UNSPECIFIED TYPE (MULTI): ICD-10-CM

## 2024-06-03 DIAGNOSIS — I10 BENIGN HYPERTENSION: ICD-10-CM

## 2024-06-03 LAB
ANION GAP SERPL CALC-SCNC: 12 MMOL/L (ref 10–20)
BUN SERPL-MCNC: 19 MG/DL (ref 6–23)
CALCIUM SERPL-MCNC: 9.5 MG/DL (ref 8.6–10.3)
CHLORIDE SERPL-SCNC: 100 MMOL/L (ref 98–107)
CO2 SERPL-SCNC: 36 MMOL/L (ref 21–32)
CREAT SERPL-MCNC: 1.29 MG/DL (ref 0.5–1.05)
EGFRCR SERPLBLD CKD-EPI 2021: 43 ML/MIN/1.73M*2
GLUCOSE SERPL-MCNC: 135 MG/DL (ref 74–99)
POTASSIUM SERPL-SCNC: 3.6 MMOL/L (ref 3.5–5.3)
SODIUM SERPL-SCNC: 144 MMOL/L (ref 136–145)

## 2024-06-03 PROCEDURE — 36415 COLL VENOUS BLD VENIPUNCTURE: CPT

## 2024-06-03 PROCEDURE — 80048 BASIC METABOLIC PNL TOTAL CA: CPT

## 2024-06-06 ENCOUNTER — TELEPHONE (OUTPATIENT)
Dept: CARDIOLOGY | Facility: CLINIC | Age: 79
End: 2024-06-06
Payer: MEDICARE

## 2024-06-06 NOTE — TELEPHONE ENCOUNTER
----- Message from William Wallace MD sent at 6/6/2024  2:21 PM EDT -----  I spoke with patient's daughter over the phone and decreased her torsemide to every other day.

## 2024-06-07 PROBLEM — R51.9 HEADACHE, UNSPECIFIED: Status: ACTIVE | Noted: 2023-08-15

## 2024-06-07 PROBLEM — R06.02 SHORTNESS OF BREATH: Status: ACTIVE | Noted: 2023-08-15

## 2024-06-07 PROBLEM — J96.91 RESPIRATORY FAILURE, UNSPECIFIED WITH HYPOXIA (MULTI): Status: ACTIVE | Noted: 2023-08-15

## 2024-06-07 PROBLEM — R41.3 OTHER AMNESIA: Status: ACTIVE | Noted: 2023-08-15

## 2024-06-10 ENCOUNTER — OFFICE VISIT (OUTPATIENT)
Dept: CARDIOLOGY | Facility: CLINIC | Age: 79
End: 2024-06-10
Payer: MEDICARE

## 2024-06-10 VITALS
SYSTOLIC BLOOD PRESSURE: 112 MMHG | WEIGHT: 146 LBS | BODY MASS INDEX: 26.7 KG/M2 | HEART RATE: 86 BPM | DIASTOLIC BLOOD PRESSURE: 70 MMHG

## 2024-06-10 DIAGNOSIS — I48.91 ATRIAL FIBRILLATION, UNSPECIFIED TYPE (MULTI): Primary | ICD-10-CM

## 2024-06-10 DIAGNOSIS — I10 BENIGN HYPERTENSION: ICD-10-CM

## 2024-06-10 PROCEDURE — 3078F DIAST BP <80 MM HG: CPT | Performed by: INTERNAL MEDICINE

## 2024-06-10 PROCEDURE — 1159F MED LIST DOCD IN RCRD: CPT | Performed by: INTERNAL MEDICINE

## 2024-06-10 PROCEDURE — 1157F ADVNC CARE PLAN IN RCRD: CPT | Performed by: INTERNAL MEDICINE

## 2024-06-10 PROCEDURE — 99213 OFFICE O/P EST LOW 20 MIN: CPT | Performed by: INTERNAL MEDICINE

## 2024-06-10 PROCEDURE — 3074F SYST BP LT 130 MM HG: CPT | Performed by: INTERNAL MEDICINE

## 2024-06-10 NOTE — PROGRESS NOTES
1.  New onset atrial fibrillation  2.  Small pericardial effusion, unclear etiology  3.  Hypertension  4.  Hyperlipidemia  5.  Noncompliant with medical advice  6.  Memory deficits  7.  Overweight    Patient is a pleasant 78-year-old female with the above-noted pertinent past medical history who presents today for follow-up of her lower extremity edema and atrial fibrillation, she has no complaints of palpitation she states her exertional shortness of breath and overall level of energy has significantly improved with increasing dose of torsemide patient has now managed over 20 pounds weight loss, she denies any complaints of orthopnea PND palpitation or syncopal episodes.    Today's weight at 146 previously at 166 pounds, blood pressure 112/70 mmHg and heart rate of 86 bpm elderly female accompanied by her daughter who provides most of the history, no carotid bruits heart irregularly irregular S1 variable S2 normal no gallop or murmurs lungs clear to auscultation all fields abdomen is mildly distended positive bowel sounds soft and nontender, and the lower extremities have no edema.    6 3/24  Sodium 135, potassium 3.6, BUN 19, creatinine 1.29 (creatinine of 0.9 previously) blood glucose level elevated 144    New onset of atrial fibrillation fairly consistent with her Eliquis according to her daughter who keeps track of her medication patient has missed only 1 dose past 4 weeks, compliance was reemphasized patient may be scheduled for Octave cardioversion, think that her volume status and diuresis will be much easier and hopefully not dependent high doses of torsemide for further diuresis after the cardioversion, follow-up basic metabolic profile for her renal function will be obtained, patient is to return to my clinic in 1 week after the elective cardioversion.

## 2024-06-14 ENCOUNTER — TELEPHONE (OUTPATIENT)
Dept: RADIOLOGY | Facility: HOSPITAL | Age: 79
End: 2024-06-14
Payer: MEDICARE

## 2024-06-17 ENCOUNTER — HOSPITAL ENCOUNTER (OUTPATIENT)
Dept: CARDIOLOGY | Facility: HOSPITAL | Age: 79
Discharge: HOME | End: 2024-06-17
Payer: MEDICARE

## 2024-06-17 ENCOUNTER — HOSPITAL ENCOUNTER (OUTPATIENT)
Dept: CARDIOLOGY | Facility: HOSPITAL | Age: 79
Setting detail: OUTPATIENT SURGERY
Discharge: HOME | End: 2024-06-17
Payer: MEDICARE

## 2024-06-17 VITALS
TEMPERATURE: 95.9 F | HEART RATE: 74 BPM | SYSTOLIC BLOOD PRESSURE: 145 MMHG | HEIGHT: 62 IN | OXYGEN SATURATION: 99 % | RESPIRATION RATE: 18 BRPM | WEIGHT: 147 LBS | BODY MASS INDEX: 27.05 KG/M2 | DIASTOLIC BLOOD PRESSURE: 72 MMHG

## 2024-06-17 DIAGNOSIS — I48.19 PERSISTENT ATRIAL FIBRILLATION (MULTI): Primary | ICD-10-CM

## 2024-06-17 DIAGNOSIS — I48.19 PERSISTENT ATRIAL FIBRILLATION (MULTI): ICD-10-CM

## 2024-06-17 DIAGNOSIS — I48.91 ATRIAL FIBRILLATION, UNSPECIFIED TYPE (MULTI): ICD-10-CM

## 2024-06-17 DIAGNOSIS — I10 BENIGN HYPERTENSION: ICD-10-CM

## 2024-06-17 LAB
ANION GAP SERPL CALC-SCNC: 11 MMOL/L (ref 10–20)
BUN SERPL-MCNC: 22 MG/DL (ref 6–23)
CALCIUM SERPL-MCNC: 10 MG/DL (ref 8.6–10.3)
CHLORIDE SERPL-SCNC: 102 MMOL/L (ref 98–107)
CO2 SERPL-SCNC: 33 MMOL/L (ref 21–32)
CREAT SERPL-MCNC: 1.1 MG/DL (ref 0.5–1.05)
EGFRCR SERPLBLD CKD-EPI 2021: 52 ML/MIN/1.73M*2
GLUCOSE SERPL-MCNC: 114 MG/DL (ref 74–99)
POTASSIUM SERPL-SCNC: 3.9 MMOL/L (ref 3.5–5.3)
SODIUM SERPL-SCNC: 142 MMOL/L (ref 136–145)

## 2024-06-17 PROCEDURE — 36415 COLL VENOUS BLD VENIPUNCTURE: CPT | Performed by: INTERNAL MEDICINE

## 2024-06-17 PROCEDURE — 93005 ELECTROCARDIOGRAM TRACING: CPT

## 2024-06-17 PROCEDURE — 82374 ASSAY BLOOD CARBON DIOXIDE: CPT | Performed by: INTERNAL MEDICINE

## 2024-06-17 PROCEDURE — 2500000004 HC RX 250 GENERAL PHARMACY W/ HCPCS (ALT 636 FOR OP/ED): Performed by: INTERNAL MEDICINE

## 2024-06-17 PROCEDURE — 7100000010 HC PHASE TWO TIME - EACH INCREMENTAL 1 MINUTE

## 2024-06-17 PROCEDURE — 92960 CARDIOVERSION ELECTRIC EXT: CPT | Performed by: INTERNAL MEDICINE

## 2024-06-17 PROCEDURE — 92960 CARDIOVERSION ELECTRIC EXT: CPT

## 2024-06-17 PROCEDURE — 93010 ELECTROCARDIOGRAM REPORT: CPT | Performed by: INTERNAL MEDICINE

## 2024-06-17 PROCEDURE — 7100000009 HC PHASE TWO TIME - INITIAL BASE CHARGE

## 2024-06-17 PROCEDURE — 3700000012 HC SEDATION LEVEL 5+ TIME - INITIAL 15 MINUTES 5/> YEARS

## 2024-06-17 RX ORDER — PROPOFOL 10 MG/ML
INJECTION, EMULSION INTRAVENOUS AS NEEDED
Status: DISCONTINUED | OUTPATIENT
Start: 2024-06-17 | End: 2024-06-17 | Stop reason: HOSPADM

## 2024-06-17 ASSESSMENT — PAIN SCALES - GENERAL
PAINLEVEL_OUTOF10: 0 - NO PAIN
PAINLEVEL_OUTOF10: 0 - NO PAIN

## 2024-06-17 ASSESSMENT — ENCOUNTER SYMPTOMS
DEPRESSION: 0
LOSS OF SENSATION IN FEET: 0
OCCASIONAL FEELINGS OF UNSTEADINESS: 0

## 2024-06-17 ASSESSMENT — PAIN - FUNCTIONAL ASSESSMENT
PAIN_FUNCTIONAL_ASSESSMENT: 0-10
PAIN_FUNCTIONAL_ASSESSMENT: 0-10

## 2024-06-17 NOTE — DISCHARGE INSTRUCTIONS
No driving, drinking alcohol, or making legal decisions for 24 hours. Dr Sunshine's office will call to schedule a follow up appointment.

## 2024-06-17 NOTE — NURSING NOTE
Discharge instructions reviewed with daughter/POA and patient, patient discharged via wheelchair to waiting car, no complaints at this time

## 2024-06-18 PROCEDURE — 93005 ELECTROCARDIOGRAM TRACING: CPT

## 2024-06-19 LAB
ATRIAL RATE: 59 BPM
P AXIS: 74 DEGREES
P OFFSET: 127 MS
P ONSET: 89 MS
PR INTERVAL: 254 MS
Q ONSET: 216 MS
QRS COUNT: 9 BEATS
QRS DURATION: 78 MS
QT INTERVAL: 472 MS
QTC CALCULATION(BAZETT): 467 MS
QTC FREDERICIA: 469 MS
R AXIS: -28 DEGREES
T AXIS: 73 DEGREES
T OFFSET: 452 MS
VENTRICULAR RATE: 59 BPM

## 2024-06-20 ENCOUNTER — TELEPHONE (OUTPATIENT)
Dept: PRIMARY CARE | Facility: CLINIC | Age: 79
End: 2024-06-20
Payer: MEDICARE

## 2024-06-20 NOTE — TELEPHONE ENCOUNTER
Shannon at [facility] called and is requesting a letter for waiver for pts assisted living program.   Shannon [phone]   Fax letter [phone]

## 2024-06-21 ENCOUNTER — TELEPHONE (OUTPATIENT)
Dept: PRIMARY CARE | Facility: CLINIC | Age: 79
End: 2024-06-21
Payer: MEDICARE

## 2024-06-21 NOTE — TELEPHONE ENCOUNTER
----- Message from Anderson Gutierrez DO sent at 6/21/2024  8:05 AM EDT -----  Please forward CT results to Dr. Dodd Patient's pulmonary doctor.

## 2024-06-24 ENCOUNTER — APPOINTMENT (OUTPATIENT)
Dept: CARDIOLOGY | Facility: CLINIC | Age: 79
End: 2024-06-24
Payer: MEDICARE

## 2024-06-24 VITALS
WEIGHT: 150 LBS | HEART RATE: 94 BPM | SYSTOLIC BLOOD PRESSURE: 128 MMHG | BODY MASS INDEX: 27.6 KG/M2 | TEMPERATURE: 97.5 F | DIASTOLIC BLOOD PRESSURE: 82 MMHG | HEIGHT: 62 IN

## 2024-06-24 DIAGNOSIS — R94.31 ABNORMAL ECG: Primary | ICD-10-CM

## 2024-06-24 DIAGNOSIS — I50.30 HEART FAILURE WITH PRESERVED EJECTION FRACTION, UNSPECIFIED HF CHRONICITY (MULTI): ICD-10-CM

## 2024-06-24 DIAGNOSIS — I48.19 PERSISTENT ATRIAL FIBRILLATION (MULTI): ICD-10-CM

## 2024-06-24 DIAGNOSIS — I10 BENIGN HYPERTENSION: ICD-10-CM

## 2024-06-24 PROCEDURE — 1126F AMNT PAIN NOTED NONE PRSNT: CPT | Performed by: INTERNAL MEDICINE

## 2024-06-24 PROCEDURE — 99214 OFFICE O/P EST MOD 30 MIN: CPT | Performed by: INTERNAL MEDICINE

## 2024-06-24 PROCEDURE — 1036F TOBACCO NON-USER: CPT | Performed by: INTERNAL MEDICINE

## 2024-06-24 PROCEDURE — 1157F ADVNC CARE PLAN IN RCRD: CPT | Performed by: INTERNAL MEDICINE

## 2024-06-24 PROCEDURE — 3074F SYST BP LT 130 MM HG: CPT | Performed by: INTERNAL MEDICINE

## 2024-06-24 PROCEDURE — 3079F DIAST BP 80-89 MM HG: CPT | Performed by: INTERNAL MEDICINE

## 2024-06-24 PROCEDURE — 93000 ELECTROCARDIOGRAM COMPLETE: CPT | Performed by: INTERNAL MEDICINE

## 2024-06-24 PROCEDURE — 1159F MED LIST DOCD IN RCRD: CPT | Performed by: INTERNAL MEDICINE

## 2024-06-24 ASSESSMENT — PAIN SCALES - GENERAL: PAINLEVEL: 0-NO PAIN

## 2024-06-24 NOTE — PROGRESS NOTES
1.  New onset atrial fibrillation  2.  Small pericardial effusion, unclear etiology  3.  Hypertension  4.  Hyperlipidemia  5.  Noncompliant with medical advice  6.  Memory deficits  7.  Overweight        Patient is a pleasant 78-year-old female with the above-noted pertinent past medical history who presents today for follow-up of her cardioversion, June 17, 2024 patient underwent elective cardioversion, since she has not really noticed any changes in her clinical status, she has no complaints of palpitation, she states that with the diuretic her lower extremity edema has remained under good control, she has no complaints of orthopnea PND palpitation or syncopal episode.    BMI of 27.4, blood pressure 128/82 mmHg and heart rate presentation at 94 bpm well-developed well-nourished female accompanied by her daughter, no carotid bruits upstroke and volumes are normal heart irregularly irregular S1 variable S2 is normal no gallop or murmurs, lungs clear to auscultation abdomen is moderately distended positive bowel sounds soft and nontender, and the lower extremities have no edema    Twelve-lead EKG today shows atrial fibrillation left axis deviation low voltage QRS and possible inferior infarct of undetermined age as well is poor R wave progression across the anterior precordial leads    Atrial fibrillation with failed cardioversion number options including rate control rhythm control and radiofrequency ablation was extensively discussed and details provided patient wishes to proceed with rate control at this time, the importance of continue anticoagulation Eliquis 1 tablet twice a day was reviewed.  Abnormal EKG with low voltage QRS presentation of heart failure suspicious for cardiac amyloidosis, technetium pyrophosphate study is requested patient agrees to the testing, however states that she remains undecided to possible cardiac biopsy at this time.  Risk-benefit and options were reviewed, patient is to return to  my clinic as previously scheduled in August 2024.

## 2024-07-03 ENCOUNTER — APPOINTMENT (OUTPATIENT)
Dept: RADIOLOGY | Facility: HOSPITAL | Age: 79
End: 2024-07-03
Payer: MEDICARE

## 2024-07-08 ENCOUNTER — HOSPITAL ENCOUNTER (OUTPATIENT)
Dept: RADIOLOGY | Facility: HOSPITAL | Age: 79
Discharge: HOME | End: 2024-07-08
Payer: MEDICARE

## 2024-07-08 DIAGNOSIS — R94.31 ABNORMAL ECG: ICD-10-CM

## 2024-07-08 DIAGNOSIS — I50.30 HEART FAILURE WITH PRESERVED EJECTION FRACTION, UNSPECIFIED HF CHRONICITY (MULTI): ICD-10-CM

## 2024-07-08 DIAGNOSIS — I48.19 PERSISTENT ATRIAL FIBRILLATION (MULTI): ICD-10-CM

## 2024-07-08 DIAGNOSIS — I10 BENIGN HYPERTENSION: ICD-10-CM

## 2024-07-08 PROCEDURE — A9538 TC99M PYROPHOSPHATE: HCPCS | Performed by: INTERNAL MEDICINE

## 2024-07-08 PROCEDURE — 78469 HEART INFARCT IMAGE (3D): CPT

## 2024-07-08 PROCEDURE — 3430000001 HC RX 343 DIAGNOSTIC RADIOPHARMACEUTICALS: Performed by: INTERNAL MEDICINE

## 2024-07-12 ENCOUNTER — TELEPHONE (OUTPATIENT)
Dept: CARDIOLOGY | Facility: CLINIC | Age: 79
End: 2024-07-12
Payer: MEDICARE

## 2024-07-12 NOTE — TELEPHONE ENCOUNTER
----- Message from William Wallace sent at 7/12/2024  9:50 AM EDT -----  Test is negative for amyloid heart disease

## 2024-07-17 ENCOUNTER — TELEPHONE (OUTPATIENT)
Dept: PRIMARY CARE | Facility: CLINIC | Age: 79
End: 2024-07-17
Payer: MEDICARE

## 2024-07-17 NOTE — TELEPHONE ENCOUNTER
Shannon crisostomo [phone]    Advised they faxed documents on 6/5/24. We did not receive. They advised they faxed to [phone]. I called and gave the correct fax number

## 2024-07-23 DIAGNOSIS — I48.91 ATRIAL FIBRILLATION, UNSPECIFIED TYPE (MULTI): ICD-10-CM

## 2024-07-23 DIAGNOSIS — I10 BENIGN HYPERTENSION: ICD-10-CM

## 2024-07-23 DIAGNOSIS — R60.0 BILATERAL LEG EDEMA: ICD-10-CM

## 2024-07-24 RX ORDER — TORSEMIDE 20 MG/1
20 TABLET ORAL DAILY
Qty: 30 TABLET | Refills: 1 | Status: SHIPPED | OUTPATIENT
Start: 2024-07-24 | End: 2024-09-22

## 2024-08-20 ENCOUNTER — APPOINTMENT (OUTPATIENT)
Dept: CARDIOLOGY | Facility: CLINIC | Age: 79
End: 2024-08-20
Payer: MEDICARE

## 2024-08-20 VITALS
BODY MASS INDEX: 27.23 KG/M2 | SYSTOLIC BLOOD PRESSURE: 118 MMHG | WEIGHT: 148 LBS | TEMPERATURE: 98 F | HEIGHT: 62 IN | DIASTOLIC BLOOD PRESSURE: 64 MMHG | HEART RATE: 79 BPM

## 2024-08-20 DIAGNOSIS — E66.3 OVERWEIGHT (BMI 25.0-29.9): ICD-10-CM

## 2024-08-20 DIAGNOSIS — I48.19 PERSISTENT ATRIAL FIBRILLATION (MULTI): ICD-10-CM

## 2024-08-20 DIAGNOSIS — I10 BENIGN HYPERTENSION: ICD-10-CM

## 2024-08-20 DIAGNOSIS — E78.2 MIXED HYPERLIPIDEMIA: Primary | ICD-10-CM

## 2024-08-20 PROCEDURE — 99214 OFFICE O/P EST MOD 30 MIN: CPT | Performed by: INTERNAL MEDICINE

## 2024-08-20 PROCEDURE — 3074F SYST BP LT 130 MM HG: CPT | Performed by: INTERNAL MEDICINE

## 2024-08-20 PROCEDURE — 1159F MED LIST DOCD IN RCRD: CPT | Performed by: INTERNAL MEDICINE

## 2024-08-20 PROCEDURE — 3078F DIAST BP <80 MM HG: CPT | Performed by: INTERNAL MEDICINE

## 2024-08-20 PROCEDURE — 1157F ADVNC CARE PLAN IN RCRD: CPT | Performed by: INTERNAL MEDICINE

## 2024-08-20 PROCEDURE — 1036F TOBACCO NON-USER: CPT | Performed by: INTERNAL MEDICINE

## 2024-08-20 NOTE — PROGRESS NOTES
1.  New onset atrial fibrillation  2.  Small pericardial effusion, unclear etiology  3.  Hypertension  4.  Hyperlipidemia  5.  Noncompliant with medical advice  6.  Memory deficits  7.  Overweight           Patient is a pleasant 78-year-old female with the above-noted pertinent past medical history who presents today for follow-up.  She states that she is doing very well she has no complaints of exertional chest pain or shortness of breath, daughter present at the visit time concurs that she is doing very well and she has remained compliant with her medication and low-sodium diet.  She denies any complaints of orthopnea PND palpitation or syncopal episodes    Vital signs reviewed and stable BMI mildly elevated 27.1, blood pressure 118/64 mmHg and heart rate of 79 bpm patient is a well-developed well-nourished female in no acute distress speaking full sentences no carotid bruits upstroke and volumes are normal heart rate and rhythm are irregular S1 is variable S2 is normal no gallop or murmurs appreciated lungs clear to auscultation abdomen is moderately distended positive bowel sounds soft and nontender and the lower extremities have no edema    6/7/2024  Sodium of 142, potassium 3.9, BUN 22, creatinine of 1.1 blood glucose level of 114, GFR is improved from 43 up to 52.    New onset atrial fibrillation failed cardioversion currently on anticoagulation patient's ventricular rate is well-controlled without rate limiting medication indicating some conduction delay inherent to the heart, these findings was discussed with the patient and the family members will continue to follow for possible need of a pacemaker in the future.  Low voltage QRS with negative technetium pyrophosphate for amyloid we will continue with clinical follow-ups  Hypertension under good control  Mildly overweight heart healthy diet and weight loss recommended  Return to cardiology in 6 months.

## 2024-09-07 DIAGNOSIS — R60.0 BILATERAL LEG EDEMA: ICD-10-CM

## 2024-09-07 DIAGNOSIS — I10 BENIGN HYPERTENSION: ICD-10-CM

## 2024-09-07 DIAGNOSIS — I48.91 ATRIAL FIBRILLATION, UNSPECIFIED TYPE (MULTI): ICD-10-CM

## 2024-09-09 RX ORDER — TORSEMIDE 20 MG/1
20 TABLET ORAL DAILY
Qty: 30 TABLET | Refills: 1 | Status: SHIPPED | OUTPATIENT
Start: 2024-09-09 | End: 2024-11-08

## 2024-09-13 DIAGNOSIS — E55.9 VITAMIN D DEFICIENCY: ICD-10-CM

## 2024-09-18 RX ORDER — ASPIRIN 325 MG
50000 TABLET, DELAYED RELEASE (ENTERIC COATED) ORAL
Qty: 12 CAPSULE | Refills: 1 | Status: SHIPPED | OUTPATIENT
Start: 2024-09-22

## 2024-10-04 DIAGNOSIS — E78.2 MIXED HYPERLIPIDEMIA: ICD-10-CM

## 2024-10-07 RX ORDER — ROSUVASTATIN CALCIUM 10 MG/1
10 TABLET, COATED ORAL NIGHTLY
Qty: 90 TABLET | Refills: 1 | Status: SHIPPED | OUTPATIENT
Start: 2024-10-07 | End: 2025-04-05

## 2024-11-06 DIAGNOSIS — J44.9 CHRONIC OBSTRUCTIVE PULMONARY DISEASE, UNSPECIFIED COPD TYPE (MULTI): ICD-10-CM

## 2024-11-06 RX ORDER — FLUTICASONE FUROATE, UMECLIDINIUM BROMIDE AND VILANTEROL TRIFENATATE 100; 62.5; 25 UG/1; UG/1; UG/1
1 POWDER RESPIRATORY (INHALATION) DAILY
Qty: 180 EACH | Refills: 1 | Status: SHIPPED | OUTPATIENT
Start: 2024-11-06

## 2024-11-11 ENCOUNTER — APPOINTMENT (OUTPATIENT)
Dept: PRIMARY CARE | Facility: CLINIC | Age: 79
End: 2024-11-11
Payer: MEDICARE

## 2024-11-19 ENCOUNTER — APPOINTMENT (OUTPATIENT)
Dept: PRIMARY CARE | Facility: CLINIC | Age: 79
End: 2024-11-19
Payer: MEDICARE

## 2024-11-19 VITALS
HEIGHT: 62 IN | BODY MASS INDEX: 28.26 KG/M2 | SYSTOLIC BLOOD PRESSURE: 157 MMHG | TEMPERATURE: 97.6 F | HEART RATE: 63 BPM | DIASTOLIC BLOOD PRESSURE: 87 MMHG | WEIGHT: 153.6 LBS

## 2024-11-19 DIAGNOSIS — J44.9 CHRONIC OBSTRUCTIVE PULMONARY DISEASE, UNSPECIFIED COPD TYPE (MULTI): ICD-10-CM

## 2024-11-19 DIAGNOSIS — Z13.29 THYROID DISORDER SCREENING: ICD-10-CM

## 2024-11-19 DIAGNOSIS — E78.2 MIXED HYPERLIPIDEMIA: ICD-10-CM

## 2024-11-19 DIAGNOSIS — Z23 NEED FOR INFLUENZA VACCINATION: ICD-10-CM

## 2024-11-19 DIAGNOSIS — E55.9 VITAMIN D DEFICIENCY: ICD-10-CM

## 2024-11-19 DIAGNOSIS — L98.9 SKIN LESION OF BACK: ICD-10-CM

## 2024-11-19 DIAGNOSIS — I10 BENIGN HYPERTENSION: Primary | ICD-10-CM

## 2024-11-19 DIAGNOSIS — L98.1 NEUROTIC EXCORIATIONS: ICD-10-CM

## 2024-11-19 PROCEDURE — 1160F RVW MEDS BY RX/DR IN RCRD: CPT | Performed by: INTERNAL MEDICINE

## 2024-11-19 PROCEDURE — 1158F ADVNC CARE PLAN TLK DOCD: CPT | Performed by: INTERNAL MEDICINE

## 2024-11-19 PROCEDURE — 1157F ADVNC CARE PLAN IN RCRD: CPT | Performed by: INTERNAL MEDICINE

## 2024-11-19 PROCEDURE — 3079F DIAST BP 80-89 MM HG: CPT | Performed by: INTERNAL MEDICINE

## 2024-11-19 PROCEDURE — 90662 IIV NO PRSV INCREASED AG IM: CPT | Performed by: INTERNAL MEDICINE

## 2024-11-19 PROCEDURE — G0008 ADMIN INFLUENZA VIRUS VAC: HCPCS | Performed by: INTERNAL MEDICINE

## 2024-11-19 PROCEDURE — G2211 COMPLEX E/M VISIT ADD ON: HCPCS | Performed by: INTERNAL MEDICINE

## 2024-11-19 PROCEDURE — 1123F ACP DISCUSS/DSCN MKR DOCD: CPT | Performed by: INTERNAL MEDICINE

## 2024-11-19 PROCEDURE — 3077F SYST BP >= 140 MM HG: CPT | Performed by: INTERNAL MEDICINE

## 2024-11-19 PROCEDURE — 1159F MED LIST DOCD IN RCRD: CPT | Performed by: INTERNAL MEDICINE

## 2024-11-19 PROCEDURE — 99213 OFFICE O/P EST LOW 20 MIN: CPT | Performed by: INTERNAL MEDICINE

## 2024-11-19 ASSESSMENT — PATIENT HEALTH QUESTIONNAIRE - PHQ9
1. LITTLE INTEREST OR PLEASURE IN DOING THINGS: NOT AT ALL
2. FEELING DOWN, DEPRESSED OR HOPELESS: NOT AT ALL
SUM OF ALL RESPONSES TO PHQ9 QUESTIONS 1 AND 2: 0

## 2024-11-19 NOTE — PROGRESS NOTES
"Subjective   Patient ID: Marion Robles is a 79 y.o. female who presents for Follow-up (6 months).    HPI  Patient has had improvement in leg swelling since our last office visit. She missed some medication doses this week. Patient misses dose on occasion. Patient lives at an independent living facility. Patient is a nonsmoker. Patient was exposed to significant second hand smoke.     She has a lesion of the back for several years.  She will occasionally have blood on her shirt but she has not been touching it recently.  We would update her flu shot today.    Review of Systems   Constitutional:  Negative for chills and fever.   HENT:  Negative for sore throat.    Eyes:  Negative for visual disturbance.   Respiratory:  Positive for cough. Negative for shortness of breath.    Cardiovascular:  Negative for chest pain and leg swelling.   Gastrointestinal:  Negative for abdominal pain, nausea and vomiting.   Genitourinary:  Negative for dysuria.   Skin:  Negative for rash.   Neurological:  Positive for dizziness. Negative for syncope and light-headedness.       Objective   /87 (BP Location: Left arm, Patient Position: Sitting, BP Cuff Size: Adult)   Pulse 63   Temp 36.4 °C (97.6 °F)   Ht 1.575 m (5' 2\")   Wt 69.7 kg (153 lb 9.6 oz)   BMI 28.09 kg/m²     Physical Exam  Vitals and nursing note reviewed.   Constitutional:       General: She is not in acute distress.     Appearance: Normal appearance. She is not ill-appearing, toxic-appearing or diaphoretic.   HENT:      Head: Normocephalic and atraumatic.      Nose: No rhinorrhea.      Mouth/Throat:      Mouth: Mucous membranes are moist.      Pharynx: Oropharynx is clear.   Eyes:      Extraocular Movements: Extraocular movements intact.      Pupils: Pupils are equal, round, and reactive to light.   Cardiovascular:      Rate and Rhythm: Normal rate and regular rhythm.      Heart sounds: Normal heart sounds.   Pulmonary:      Effort: Pulmonary effort is normal. No " respiratory distress.      Breath sounds: Normal breath sounds. No wheezing, rhonchi or rales.   Abdominal:      General: There is no distension.      Palpations: Abdomen is soft. There is no mass.      Tenderness: There is no abdominal tenderness. There is no guarding.   Musculoskeletal:      Cervical back: Neck supple.      Right lower leg: No edema.      Left lower leg: No edema.   Skin:     General: Skin is warm and dry.      Findings: Lesion (excoriations of the skin) present. No rash.   Neurological:      General: No focal deficit present.      Mental Status: She is alert. Mental status is at baseline.   Psychiatric:         Mood and Affect: Mood normal.         Behavior: Behavior normal.         Thought Content: Thought content normal.         Judgment: Judgment normal.         Assessment/Plan   Problem List Items Addressed This Visit           ICD-10-CM    Vitamin D deficiency E55.9    Relevant Orders    Vitamin D 25-Hydroxy,Total (for eval of Vitamin D levels)    Thyroid disorder screening Z13.29    Relevant Orders    TSH with reflex to Free T4 if abnormal    Mixed hyperlipidemia E78.2    Relevant Orders    CBC and Auto Differential    Comprehensive metabolic panel    Lipid panel    Benign hypertension - Primary I10    Relevant Orders    CBC and Auto Differential    Comprehensive metabolic panel    Neurotic excoriations L98.1    Skin lesion of back L98.9    Relevant Orders    Referral to Dermatology    Need for influenza vaccination Z23    Relevant Orders    Flu vaccine, trivalent, preservative free, HIGH-DOSE, age 65y+ (Fluzone) (Completed)     Vitamin D deficiency: Check a vitamin D level.  Patient remains on vitamin D supplement    Thyroid disorder screening: Check a TSH level    Mixed hyperlipidemia: Will check CMP and lipid panel she remains on rosuvastatin    Hypertension: Blood pressure elevated in the office today.  Patient on valsartan.  Can be noncompliant with taking medications.    Skin lesion  on back/neurotic excoriations: We refer the patient to dermatology for further evaluation and treatment    COPD: There is supposed to be taking Trelegy.  Patient noncompliant with medication.

## 2025-02-27 ENCOUNTER — APPOINTMENT (OUTPATIENT)
Dept: CARDIOLOGY | Facility: CLINIC | Age: 80
End: 2025-02-27
Payer: MEDICARE

## 2025-03-11 ENCOUNTER — APPOINTMENT (OUTPATIENT)
Dept: PRIMARY CARE | Facility: CLINIC | Age: 80
End: 2025-03-11
Payer: MEDICARE

## 2025-03-14 DIAGNOSIS — E55.9 VITAMIN D DEFICIENCY: ICD-10-CM

## 2025-03-24 RX ORDER — ASPIRIN 325 MG
50000 TABLET, DELAYED RELEASE (ENTERIC COATED) ORAL
Qty: 12 CAPSULE | Refills: 1 | Status: SHIPPED | OUTPATIENT
Start: 2025-03-30

## 2025-03-31 ENCOUNTER — APPOINTMENT (OUTPATIENT)
Dept: PRIMARY CARE | Facility: CLINIC | Age: 80
End: 2025-03-31
Payer: MEDICARE

## 2025-04-10 ENCOUNTER — APPOINTMENT (OUTPATIENT)
Dept: CARDIOLOGY | Facility: CLINIC | Age: 80
End: 2025-04-10
Payer: MEDICARE

## 2025-04-10 VITALS
HEIGHT: 62 IN | SYSTOLIC BLOOD PRESSURE: 108 MMHG | HEART RATE: 79 BPM | DIASTOLIC BLOOD PRESSURE: 74 MMHG | TEMPERATURE: 97.9 F | BODY MASS INDEX: 28.19 KG/M2 | WEIGHT: 153.2 LBS

## 2025-04-10 DIAGNOSIS — E78.2 MIXED HYPERLIPIDEMIA: Primary | ICD-10-CM

## 2025-04-10 DIAGNOSIS — I48.91 ATRIAL FIBRILLATION, UNSPECIFIED TYPE (MULTI): ICD-10-CM

## 2025-04-10 DIAGNOSIS — I10 BENIGN HYPERTENSION: ICD-10-CM

## 2025-04-10 DIAGNOSIS — Z91.148 NONCOMPLIANCE WITH DIET AND MEDICATION REGIMEN: ICD-10-CM

## 2025-04-10 DIAGNOSIS — R60.0 BILATERAL LEG EDEMA: ICD-10-CM

## 2025-04-10 DIAGNOSIS — Z91.199 NONCOMPLIANCE WITH DIET AND MEDICATION REGIMEN: ICD-10-CM

## 2025-04-10 DIAGNOSIS — I95.1 ORTHOSTATIC HYPOTENSION: ICD-10-CM

## 2025-04-10 PROCEDURE — 3074F SYST BP LT 130 MM HG: CPT | Performed by: INTERNAL MEDICINE

## 2025-04-10 PROCEDURE — 1159F MED LIST DOCD IN RCRD: CPT | Performed by: INTERNAL MEDICINE

## 2025-04-10 PROCEDURE — 99214 OFFICE O/P EST MOD 30 MIN: CPT | Performed by: INTERNAL MEDICINE

## 2025-04-10 PROCEDURE — 1123F ACP DISCUSS/DSCN MKR DOCD: CPT | Performed by: INTERNAL MEDICINE

## 2025-04-10 PROCEDURE — G2211 COMPLEX E/M VISIT ADD ON: HCPCS | Performed by: INTERNAL MEDICINE

## 2025-04-10 PROCEDURE — 3078F DIAST BP <80 MM HG: CPT | Performed by: INTERNAL MEDICINE

## 2025-04-10 PROCEDURE — 1160F RVW MEDS BY RX/DR IN RCRD: CPT | Performed by: INTERNAL MEDICINE

## 2025-04-10 PROCEDURE — 1036F TOBACCO NON-USER: CPT | Performed by: INTERNAL MEDICINE

## 2025-04-10 PROCEDURE — 1157F ADVNC CARE PLAN IN RCRD: CPT | Performed by: INTERNAL MEDICINE

## 2025-04-10 RX ORDER — TORSEMIDE 20 MG/1
20 TABLET ORAL SEE ADMIN INSTRUCTIONS
Start: 2025-04-10 | End: 2025-06-09

## 2025-04-10 ASSESSMENT — ENCOUNTER SYMPTOMS
NAUSEA: 1
RESPIRATORY NEGATIVE: 1
CONFUSION: 1
ARTHRALGIAS: 1
LIGHT-HEADEDNESS: 1

## 2025-04-10 NOTE — PROGRESS NOTES
Patient:  Marion Robles  YOB: 1945  MRN: [id number]       Chief Complaint/Active Symptoms:       Marion Robles is a 79 y.o. female who returns today for cardiac follow-up of a fib and HTN.    The patient is here with her daughter for 9-month follow-up.  Basically the patient is very resistant to taking any medications.  She lives independently and her daughter sets out her medications but she just chooses not to take them.  They have set up reminders they have things on her watch but she turns them off for she actually throws the medications out.  She usually takes her medications once, or sometimes twice weekly.    She denies any chest discomfort and denies shortness of breath.  She had an inhaler nebulizer but she is thrown the inhalers out.  She does not use them.  She denies any orthopnea or PND and she is not noted to have any lower extremity edema that is resolved.  She eats very little food but she likes to have ice cream daily as well as sugar Pepsi with caffeine and 1 cup of coffee a day.  She drinks very little water.    The patient's biggest complaint is dizziness.  She cannot describe when she is dizzy but during orthostatic vital signs today she became very dizzy and lightheaded and had to sit down.  She does not have any palpitations or awareness of irregular heartbeat.  Her other complaints include nausea.      Review of Systems   Respiratory: Negative.     Gastrointestinal:  Positive for nausea.   Musculoskeletal:  Positive for arthralgias.   Neurological:  Positive for light-headedness.   Psychiatric/Behavioral:  Positive for confusion.    All other systems reviewed and are negative.      Objective:     Vitals:    04/10/25 0850   BP: 132/78   Pulse: 79   Temp: 36.6 °C (97.9 °F)       Vitals:    04/10/25 0850   Weight: 69.5 kg (153 lb 3.2 oz)       Vitals:    04/10/25 0850 04/10/25 0912 04/10/25 0913   BP: 132/78 134/72 108/74   BP Location:  Right arm Right arm   Patient Position:   "Sitting Standing   BP Cuff Size:   Adult   Pulse: 79     Temp: 36.6 °C (97.9 °F)     Weight: 69.5 kg (153 lb 3.2 oz)     Height: 1.575 m (5' 2\")        Allergies:     Allergies   Allergen Reactions    Hydrocodone Unknown    Hydrocodone-Acetaminophen Other    Oxycodone Unknown    Lidocaine Unknown     flushing    Oxycodone-Acetaminophen Unknown    Propoxyphene Unknown          Medications:     Current Outpatient Medications   Medication Instructions    albuterol 90 mcg/actuation inhaler 2 puffs, Every 6 hours PRN    albuterol 2.5 mg, Every 4 hours PRN    apixaban (ELIQUIS) 5 mg, oral, 2 times daily    cholecalciferol (VITAMIN D-3) 50,000 Units, oral, Once Weekly    rosuvastatin (CRESTOR) 10 mg, oral, Nightly    torsemide (DEMADEX) 20 mg, oral, Daily    Trelegy Ellipta 100-62.5-25 mcg blister with device 1 puff, inhalation, Daily    valsartan (DIOVAN) 160 mg, oral, Daily       Physical Examination:   GENERAL:  Well developed, well nourished, in no acute distress.  HEENT: NC AT, EOMI with anicteric sclera  NECK:  Supple, no JVD, no bruit.  CHEST:  Symmetric and nontender.  LUNGS:  Clear to auscultation bilaterally, normal respiratory effort.  HEART: PMI is nondisplaced.  There is an irregular rhythm with a controlled rate, normal S1 and S2 without S3.  There is trace ankle edema   ABDOMEN: Soft, NT, ND without palpable organomegaly or bruits  EXTREMITIES:  Warm with good color, no clubbing or cyanosis.  There is trace ankle edema noted.  PERIPHERAL VASCULAR:  Pulses present and equally palpable; 2+ throughout.  MUSCULOSKELETAL: Osteoarthritic changes  NEURO/PSYCH:  Alert and oriented times 2 with approppriate behavior and responses. Nonfocal motor examination with normal gait and ambulation  Lymph: No significant palpable lymphadenopathy  Skin: Small round scabs are patient has picked and scratched on her skin  Lab:     CBC:   Lab Results   Component Value Date    WBC 3.4 (L) 04/12/2024    RBC 4.39 04/12/2024    HGB " "12.0 04/12/2024    HCT 40.7 04/12/2024     04/12/2024        CMP:    Lab Results   Component Value Date     06/17/2024    K 3.9 06/17/2024     06/17/2024    CO2 33 (H) 06/17/2024    BUN 22 06/17/2024    CREATININE 1.10 (H) 06/17/2024    GLUCOSE 114 (H) 06/17/2024    CALCIUM 10.0 06/17/2024       Magnesium:    No results found for: \"MG\"    Lipid Profile:    Lab Results   Component Value Date    TRIG 100 04/02/2024    HDL 44.7 04/02/2024    LDLCALC 75 04/02/2024       TSH:    Lab Results   Component Value Date    TSH 2.69 04/02/2024       BNP:   Lab Results   Component Value Date     (H) 04/12/2024        PT/INR:    Lab Results   Component Value Date    PROTIME 19.9 (H) 04/12/2024    INR 1.8 (H) 04/12/2024       HgBA1c:    No results found for: \"HGBA1C\"    BMP:  Lab Results   Component Value Date     06/17/2024     06/03/2024     04/12/2024    K 3.9 06/17/2024    K 3.6 06/03/2024    K 3.9 04/12/2024     06/17/2024     06/03/2024     04/12/2024    CO2 33 (H) 06/17/2024    CO2 36 (H) 06/03/2024    CO2 28 04/12/2024    BUN 22 06/17/2024    BUN 19 06/03/2024    BUN 24 (H) 04/12/2024    CREATININE 1.10 (H) 06/17/2024    CREATININE 1.29 (H) 06/03/2024    CREATININE 0.91 04/12/2024       Cardiac Enzymes:    Lab Results   Component Value Date    TROPHS 27 (H) 04/12/2024    TROPHS 30 (H) 04/12/2024       Hepatic Function Panel:    Lab Results   Component Value Date    ALKPHOS 47 04/12/2024    ALT 17 04/12/2024    AST 18 04/12/2024    PROT 6.3 (L) 04/12/2024    BILITOT 0.6 04/12/2024     Labs reviewed    Diagnostic Studies:     No new or recent testing  Radiology:     No orders to display     ASSESSMENT     Problem List Items Addressed This Visit       Mixed hyperlipidemia - Primary    Benign hypertension    Relevant Medications    torsemide (Demadex) 20 mg tablet    Other Relevant Orders    Follow Up In Cardiology    Atrial fibrillation (Multi)    Relevant " Medications    torsemide (Demadex) 20 mg tablet    Noncompliance with diet and medication regimen     Other Visit Diagnoses       Bilateral leg edema        Relevant Medications    torsemide (Demadex) 20 mg tablet            PLAN     1.  Lightheadedness.  Orthostatic hypotension.  I suspect the patient's taking her diuretic therapy with her poor oral intake leads her to having times where she has worsening orthostatic hypotension.  I suspect some of the other times when she does not take her blood pressure medications her blood pressures are higher and has maybe some dizziness and headache associated with that.  I have recommended that her daughter pull the torsemide out of her daily regimen and only give it to her mother when she noticed that she has any significant leg edema.  This will avoid the dehydration.  1 potential cause of lightheadedness would be if she had sick sinus syndrome and bradycardic heart rates.  Given the fact that her symptoms here in the office corresponded to orthostasis and standing up we will hold off on any Holter monitor.  2.  Hypertension.  The patient takes her medications maybe once a week.  She did take her medications yesterday in anticipation of her appointment today.  Based on her resting blood pressure she probably does not need this dose of medication every day but if she were taking her medications consistently we can see or adjusted appropriately.  With her orthostasis for now I would hold off as taking it once or twice a week is not beneficial.  3.  Atrial fibrillation with prescribed anticoagulation.  The patient takes her anticoagulation once maybe twice a week.  Actually taking it this way on an intermittent basis can be more risky than not taking it at all.  I have recommend discontinuing the Eliquis and try to substitute an aspirin daily.  If she goes into some type of living facility where she will be monitored and offered her medications to be given as appropriate  would absolutely restart the medication.  The patient's daughter is aware of the increased risk of stroke and acknowledges the potential complication to her mother.  4.  Mixed hyperlipidemia.  Patient is prescribed statin therapy.  She does not eat much food during the week if she takes the medications once in a while is not harmful to her so would leave that alone.    The patient can follow-up with cardiology on an annual basis.  If she goes into a facility where she is supervised and taking her medications the we could see her more regularly.  She is seeing her primary care physician in the interim who can handle most of these problems

## 2025-04-22 ENCOUNTER — APPOINTMENT (OUTPATIENT)
Dept: PRIMARY CARE | Facility: CLINIC | Age: 80
End: 2025-04-22
Payer: MEDICARE

## 2025-04-22 VITALS
SYSTOLIC BLOOD PRESSURE: 138 MMHG | WEIGHT: 149.6 LBS | TEMPERATURE: 97 F | HEIGHT: 61 IN | HEART RATE: 60 BPM | BODY MASS INDEX: 28.25 KG/M2 | DIASTOLIC BLOOD PRESSURE: 77 MMHG

## 2025-04-22 DIAGNOSIS — E78.2 MIXED HYPERLIPIDEMIA: ICD-10-CM

## 2025-04-22 DIAGNOSIS — R41.9 NEUROCOGNITIVE DISORDER: ICD-10-CM

## 2025-04-22 DIAGNOSIS — R42 DIZZINESS: ICD-10-CM

## 2025-04-22 DIAGNOSIS — E55.9 VITAMIN D DEFICIENCY: ICD-10-CM

## 2025-04-22 DIAGNOSIS — E74.89 DISORDER OF GLUCOSE METABOLISM (MULTI): ICD-10-CM

## 2025-04-22 DIAGNOSIS — I48.91 ATRIAL FIBRILLATION, UNSPECIFIED TYPE (MULTI): ICD-10-CM

## 2025-04-22 DIAGNOSIS — I10 BENIGN HYPERTENSION: ICD-10-CM

## 2025-04-22 DIAGNOSIS — Z00.00 HEALTH MAINTENANCE EXAMINATION: Primary | ICD-10-CM

## 2025-04-22 DIAGNOSIS — J44.9 CHRONIC OBSTRUCTIVE PULMONARY DISEASE, UNSPECIFIED COPD TYPE (MULTI): ICD-10-CM

## 2025-04-22 DIAGNOSIS — H81.10 BENIGN PAROXYSMAL VERTIGO, UNSPECIFIED LATERALITY: ICD-10-CM

## 2025-04-22 LAB — POC FINGERSTICK BLOOD GLUCOSE: 133 MG/DL (ref 70–100)

## 2025-04-22 PROCEDURE — 1123F ACP DISCUSS/DSCN MKR DOCD: CPT | Performed by: INTERNAL MEDICINE

## 2025-04-22 PROCEDURE — 82962 GLUCOSE BLOOD TEST: CPT | Performed by: INTERNAL MEDICINE

## 2025-04-22 PROCEDURE — 99397 PER PM REEVAL EST PAT 65+ YR: CPT | Performed by: INTERNAL MEDICINE

## 2025-04-22 PROCEDURE — 3078F DIAST BP <80 MM HG: CPT | Performed by: INTERNAL MEDICINE

## 2025-04-22 PROCEDURE — 1158F ADVNC CARE PLAN TLK DOCD: CPT | Performed by: INTERNAL MEDICINE

## 2025-04-22 PROCEDURE — 1157F ADVNC CARE PLAN IN RCRD: CPT | Performed by: INTERNAL MEDICINE

## 2025-04-22 PROCEDURE — 99213 OFFICE O/P EST LOW 20 MIN: CPT | Performed by: INTERNAL MEDICINE

## 2025-04-22 PROCEDURE — 3075F SYST BP GE 130 - 139MM HG: CPT | Performed by: INTERNAL MEDICINE

## 2025-04-22 PROCEDURE — 1160F RVW MEDS BY RX/DR IN RCRD: CPT | Performed by: INTERNAL MEDICINE

## 2025-04-22 PROCEDURE — 1159F MED LIST DOCD IN RCRD: CPT | Performed by: INTERNAL MEDICINE

## 2025-04-22 PROCEDURE — G0439 PPPS, SUBSEQ VISIT: HCPCS | Performed by: INTERNAL MEDICINE

## 2025-04-22 PROCEDURE — 1036F TOBACCO NON-USER: CPT | Performed by: INTERNAL MEDICINE

## 2025-04-22 PROCEDURE — 1170F FXNL STATUS ASSESSED: CPT | Performed by: INTERNAL MEDICINE

## 2025-04-22 RX ORDER — ROSUVASTATIN CALCIUM 10 MG/1
10 TABLET, COATED ORAL NIGHTLY
Qty: 90 TABLET | Refills: 1 | Status: SHIPPED | OUTPATIENT
Start: 2025-04-22 | End: 2025-10-19

## 2025-04-22 RX ORDER — ASPIRIN 325 MG
1.25 TABLET, DELAYED RELEASE (ENTERIC COATED) ORAL
Qty: 12 CAPSULE | Refills: 1 | Status: SHIPPED | OUTPATIENT
Start: 2025-04-22

## 2025-04-22 ASSESSMENT — ACTIVITIES OF DAILY LIVING (ADL)
DOING_HOUSEWORK: INDEPENDENT
BATHING: INDEPENDENT
GROCERY_SHOPPING: NEEDS ASSISTANCE
MANAGING_FINANCES: TOTAL CARE
TAKING_MEDICATION: TOTAL CARE
DRESSING: INDEPENDENT

## 2025-04-22 ASSESSMENT — ENCOUNTER SYMPTOMS
SHORTNESS OF BREATH: 0
DYSURIA: 0
COUGH: 0
CHILLS: 0
DIZZINESS: 1
HEMATURIA: 0
FEVER: 0
PALPITATIONS: 0
DIARRHEA: 0
ABDOMINAL PAIN: 0
NAUSEA: 0
BLOOD IN STOOL: 0
VOMITING: 0
SORE THROAT: 0
LIGHT-HEADEDNESS: 0
AGITATION: 0

## 2025-04-22 ASSESSMENT — PATIENT HEALTH QUESTIONNAIRE - PHQ9
2. FEELING DOWN, DEPRESSED OR HOPELESS: NOT AT ALL
SUM OF ALL RESPONSES TO PHQ9 QUESTIONS 1 AND 2: 0
1. LITTLE INTEREST OR PLEASURE IN DOING THINGS: NOT AT ALL

## 2025-04-22 NOTE — PROGRESS NOTES
Subjective   Reason for Visit: Marion Robles is an 79 y.o. female here for a Medicare Wellness visit.     Past Medical, Surgical, and Family History reviewed and updated in chart.    Reviewed all medications by prescribing practitioner or clinical pharmacist (such as prescriptions, OTCs, herbal therapies and supplements) and documented in the medical record.    History of Present Illness  Marion Robles is a 79-year-old female with a history of dizziness and COPD who presents AWV    She experiences dizziness described as a spinning sensation, which has been ongoing. No associated nausea. Previous MRIs of the brain did not reveal significant findings with the exception of small chronic left cerebellar infarct. She denies recent falls or balance issues but uses a walker and cane as needed, particularly when experiencing swelling from water retention.    She has a poor appetite and has not eaten today. Her daughter notes she did not drink anything on Easter Sunday despite eating.    She has a history of medication non-compliance. Currently taking valsartan and a statin but not using her inhalers or doing breathing treatments. She has been taken off Eliquis temporarily and is using aspirin instead. Her daughter reports she often turns off her medication alarms and ignores them.    She has a history of COPD but is not compliant with using her inhalers. She was unable to complete a pulmonary function test due to gagging. Her daughter reports frequent coughing, sometimes wheezy, but it seems to originate from the sinus area.  Patient will take nasal spray with improvement.    Patient is not up-to-date on age and gender recommended screening she defers mammogram, bone density, colon cancer screening.  She is due for tetanus vaccine and shingles vaccine at I recommend she get her pharmacy.  Also due for blood work which was reprinted for her today.      Patient Care Team:  Anderson Gutierrez DO as PCP - General  Anderson COLEMAN  "Matt DO as PCP - [employer] PCP     Review of Systems   Constitutional:  Negative for chills and fever.   HENT:  Negative for sore throat.    Eyes:  Negative for visual disturbance.   Respiratory:  Negative for cough and shortness of breath.    Cardiovascular:  Negative for chest pain, palpitations and leg swelling.   Gastrointestinal:  Negative for abdominal pain, blood in stool, diarrhea, nausea and vomiting.   Genitourinary:  Negative for dysuria and hematuria.   Skin:  Negative for rash.   Neurological:  Positive for dizziness. Negative for syncope and light-headedness.   Psychiatric/Behavioral:  Negative for agitation.        Objective   Vitals:  /77 (BP Location: Right arm, Patient Position: Sitting, BP Cuff Size: Adult)   Pulse 60   Temp 36.1 °C (97 °F)   Ht 1.549 m (5' 1\")   Wt 67.9 kg (149 lb 9.6 oz)   BMI 28.27 kg/m²       Physical Exam  VITALS: BP- 130/77, SaO2- 93%  CHEST: Lungs clear to auscultation bilaterally.  EXTREMITIES: No edema in extremities.  Physical Exam  Vitals and nursing note reviewed.   Constitutional:       General: She is not in acute distress.     Appearance: Normal appearance. She is not ill-appearing, toxic-appearing or diaphoretic.   HENT:      Head: Normocephalic and atraumatic.      Nose: No rhinorrhea.      Mouth/Throat:      Mouth: Mucous membranes are moist.      Pharynx: Oropharynx is clear. No oropharyngeal exudate.   Eyes:      Extraocular Movements: Extraocular movements intact.      Right eye: Nystagmus present.      Left eye: Nystagmus present.      Pupils: Pupils are equal, round, and reactive to light.   Cardiovascular:      Rate and Rhythm: Normal rate. Rhythm irregular.      Heart sounds: Normal heart sounds.   Pulmonary:      Effort: Pulmonary effort is normal. No respiratory distress.      Breath sounds: Normal breath sounds. No wheezing, rhonchi or rales.   Abdominal:      General: Bowel sounds are normal. There is no distension.      " Palpations: Abdomen is soft. There is no mass.      Tenderness: There is no abdominal tenderness. There is no guarding.   Musculoskeletal:      Cervical back: Neck supple.      Right lower leg: No edema.      Left lower leg: No edema.   Lymphadenopathy:      Cervical: No cervical adenopathy.   Skin:     General: Skin is warm and dry.      Coloration: Skin is not jaundiced or pale.      Findings: No rash.   Neurological:      General: No focal deficit present.      Mental Status: She is alert. Mental status is at baseline.      Cranial Nerves: No cranial nerve deficit.   Psychiatric:         Mood and Affect: Mood normal.         Behavior: Behavior normal.         Thought Content: Thought content normal.         Judgment: Judgment normal.       Assessment & Plan  Dizziness  Dizziness described as head spinning, possibly related to vestibular issues. No new neurological findings. Vestibular therapy is considered as a non-pharmacological approach to manage symptoms.  - Refer to vestibular therapy for balance exercises.    Dehydration  Dehydration suspected due to low fluid intake and dizziness. Previous episode of orthostatic hypotension noted. Advised to hold torasemide unless ankle swelling occurs.  - Encourage regular fluid intake.  - Hold torasemide unless ankle swelling is present.    Hypertension  Blood pressure is slightly elevated at 130/77 mmHg. Non-adherence to antihypertensive medication regimen noted. Valsartan is being taken inconsistently.  - Encourage adherence to valsartan regimen.  - Monitor blood pressure regularly.    COPD  COPD with non-compliance to inhaler therapy. Pulmonary function test was incomplete due to gag reflex. Oxygen saturation at 93%, not low enough to require supplemental oxygen.  - Encourage use of inhalers and nebulizer as prescribed.  - Monitor oxygen saturation periodically.    Hyperlipidemia: Patient due for repeat blood work will be continued on rosuvastatin at this  time    Vitamin D deficiency: Check a vitamin D level she continues on once weekly vitamin D    Disorder of glucose metabolism/dizziness: We checked a glucose in the office that was normal    Atrial fibrillation: Currently being managed by cardiology she is on aspirin currently as she was not taking anticoagulation appropriately.    Neurocognitive disorder: Patient's daughter states that she has dementia.  Currently not on medication for memory.    Health maintenance examination: Patient is not up-to-date on age and gender recommended screening defers mammogram, bone density, colonoscopy.  She is due for tetanus and shingles vaccine which I recommend she get her pharmacy    Assessment & Plan  Vitamin D deficiency    Orders:    cholecalciferol (Vitamin D-3) 1.25 mg (50,000 units) capsule; Take 1 capsule (1.25 mg) by mouth 1 (one) time per week.    Mixed hyperlipidemia    Orders:    rosuvastatin (Crestor) 10 mg tablet; Take 1 tablet (10 mg) by mouth once daily at bedtime.    Dizziness    Orders:    POCT Fingerstick Glucose manually resulted    Disorder of glucose metabolism (Multi)    Orders:    POCT Fingerstick Glucose manually resulted    Benign hypertension         Benign paroxysmal vertigo, unspecified laterality    Orders:    Referral to Physical Therapy; Future    Health maintenance examination         Chronic obstructive pulmonary disease, unspecified COPD type (Multi)         Atrial fibrillation, unspecified type (Multi)         Neurocognitive disorder               Anderson Gutierrez,      This medical note was created with the assistance of artificial intelligence (AI) for documentation purposes. The content has been reviewed and confirmed by the healthcare provider for accuracy and completeness. Patient consented to the use of audio recording and use of AI during their visit.

## 2025-04-22 NOTE — ASSESSMENT & PLAN NOTE
Orders:    rosuvastatin (Crestor) 10 mg tablet; Take 1 tablet (10 mg) by mouth once daily at bedtime.

## 2025-04-22 NOTE — ASSESSMENT & PLAN NOTE
Orders:    cholecalciferol (Vitamin D-3) 1.25 mg (50,000 units) capsule; Take 1 capsule (1.25 mg) by mouth 1 (one) time per week.

## 2025-04-23 ASSESSMENT — ENCOUNTER SYMPTOMS
LIGHT-HEADEDNESS: 0
FEVER: 0
SHORTNESS OF BREATH: 0
NAUSEA: 0
DIZZINESS: 1
CHILLS: 0
SORE THROAT: 0
VOMITING: 0
DYSURIA: 0
ABDOMINAL PAIN: 0
COUGH: 1

## 2025-04-30 LAB
25(OH)D3+25(OH)D2 SERPL-MCNC: 62 NG/ML (ref 30–100)
ALBUMIN SERPL-MCNC: 4.3 G/DL (ref 3.6–5.1)
ALP SERPL-CCNC: 57 U/L (ref 37–153)
ALT SERPL-CCNC: 15 U/L (ref 6–29)
ANION GAP SERPL CALCULATED.4IONS-SCNC: 10 MMOL/L (CALC) (ref 7–17)
AST SERPL-CCNC: 15 U/L (ref 10–35)
BASOPHILS # BLD AUTO: 20 CELLS/UL (ref 0–200)
BASOPHILS NFR BLD AUTO: 0.5 %
BILIRUB SERPL-MCNC: 0.6 MG/DL (ref 0.2–1.2)
BUN SERPL-MCNC: 17 MG/DL (ref 7–25)
CALCIUM SERPL-MCNC: 9.2 MG/DL (ref 8.6–10.4)
CHLORIDE SERPL-SCNC: 104 MMOL/L (ref 98–110)
CHOLEST SERPL-MCNC: 198 MG/DL
CHOLEST/HDLC SERPL: 3.7 (CALC)
CO2 SERPL-SCNC: 28 MMOL/L (ref 20–32)
CREAT SERPL-MCNC: 0.98 MG/DL (ref 0.6–1)
EGFRCR SERPLBLD CKD-EPI 2021: 59 ML/MIN/1.73M2
EOSINOPHIL # BLD AUTO: 31 CELLS/UL (ref 15–500)
EOSINOPHIL NFR BLD AUTO: 0.8 %
ERYTHROCYTE [DISTWIDTH] IN BLOOD BY AUTOMATED COUNT: 14.7 % (ref 11–15)
GLUCOSE SERPL-MCNC: 135 MG/DL (ref 65–99)
HCT VFR BLD AUTO: 42.3 % (ref 35–45)
HDLC SERPL-MCNC: 54 MG/DL
HGB BLD-MCNC: 13.7 G/DL (ref 11.7–15.5)
LDLC SERPL CALC-MCNC: 123 MG/DL (CALC)
LYMPHOCYTES # BLD AUTO: 1174 CELLS/UL (ref 850–3900)
LYMPHOCYTES NFR BLD AUTO: 30.1 %
MCH RBC QN AUTO: 29 PG (ref 27–33)
MCHC RBC AUTO-ENTMCNC: 32.4 G/DL (ref 32–36)
MCV RBC AUTO: 89.4 FL (ref 80–100)
MONOCYTES # BLD AUTO: 172 CELLS/UL (ref 200–950)
MONOCYTES NFR BLD AUTO: 4.4 %
NEUTROPHILS # BLD AUTO: 2504 CELLS/UL (ref 1500–7800)
NEUTROPHILS NFR BLD AUTO: 64.2 %
NONHDLC SERPL-MCNC: 144 MG/DL (CALC)
PLATELET # BLD AUTO: 161 THOUSAND/UL (ref 140–400)
PMV BLD REES-ECKER: 11.9 FL (ref 7.5–12.5)
POTASSIUM SERPL-SCNC: 4.5 MMOL/L (ref 3.5–5.3)
PROT SERPL-MCNC: 6.5 G/DL (ref 6.1–8.1)
RBC # BLD AUTO: 4.73 MILLION/UL (ref 3.8–5.1)
SODIUM SERPL-SCNC: 142 MMOL/L (ref 135–146)
TRIGL SERPL-MCNC: 104 MG/DL
TSH SERPL-ACNC: 2.56 MIU/L (ref 0.4–4.5)
WBC # BLD AUTO: 3.9 THOUSAND/UL (ref 3.8–10.8)

## 2025-10-27 ENCOUNTER — APPOINTMENT (OUTPATIENT)
Dept: PRIMARY CARE | Facility: CLINIC | Age: 80
End: 2025-10-27
Payer: MEDICARE